# Patient Record
Sex: FEMALE | Race: BLACK OR AFRICAN AMERICAN | NOT HISPANIC OR LATINO | Employment: OTHER | URBAN - METROPOLITAN AREA
[De-identification: names, ages, dates, MRNs, and addresses within clinical notes are randomized per-mention and may not be internally consistent; named-entity substitution may affect disease eponyms.]

---

## 2021-11-10 ENCOUNTER — HOSPITAL ENCOUNTER (OUTPATIENT)
Facility: OTHER | Age: 86
Discharge: NURSING, REHAB OR LONG-TERM CARE | DRG: 522 | End: 2021-11-17
Attending: EMERGENCY MEDICINE | Admitting: INTERNAL MEDICINE
Payer: MEDICARE
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
## 2021-11-10 DIAGNOSIS — S72.002A CLOSED FRACTURE OF LEFT HIP, INITIAL ENCOUNTER: Primary | ICD-10-CM

## 2021-11-10 DIAGNOSIS — Z98.890 S/P LAPAROTOMY: ICD-10-CM

## 2021-11-10 LAB
ANION GAP SERPL CALC-SCNC: 14 MMOL/L (ref 8–16)
BASOPHILS # BLD AUTO: 0.02 K/UL (ref 0–0.2)
BASOPHILS NFR BLD: 0.1 % (ref 0–1.9)
BUN SERPL-MCNC: 20 MG/DL (ref 8–23)
CALCIUM SERPL-MCNC: 9.6 MG/DL (ref 8.7–10.5)
CHLORIDE SERPL-SCNC: 104 MMOL/L (ref 95–110)
CO2 SERPL-SCNC: 22 MMOL/L (ref 23–29)
CREAT SERPL-MCNC: 0.8 MG/DL (ref 0.5–1.4)
CTP QC/QA: YES
DIFFERENTIAL METHOD: ABNORMAL
EOSINOPHIL # BLD AUTO: 0 K/UL (ref 0–0.5)
EOSINOPHIL NFR BLD: 0.1 % (ref 0–8)
ERYTHROCYTE [DISTWIDTH] IN BLOOD BY AUTOMATED COUNT: 12.8 % (ref 11.5–14.5)
EST. GFR  (AFRICAN AMERICAN): >60 ML/MIN/1.73 M^2
EST. GFR  (NON AFRICAN AMERICAN): >60 ML/MIN/1.73 M^2
GLUCOSE SERPL-MCNC: 119 MG/DL (ref 70–110)
HCT VFR BLD AUTO: 32.7 % (ref 37–48.5)
HGB BLD-MCNC: 10.4 G/DL (ref 12–16)
IMM GRANULOCYTES # BLD AUTO: 0.1 K/UL (ref 0–0.04)
IMM GRANULOCYTES NFR BLD AUTO: 0.6 % (ref 0–0.5)
INR PPP: 1 (ref 0.8–1.2)
LYMPHOCYTES # BLD AUTO: 1.1 K/UL (ref 1–4.8)
LYMPHOCYTES NFR BLD: 6.3 % (ref 18–48)
MCH RBC QN AUTO: 29.6 PG (ref 27–31)
MCHC RBC AUTO-ENTMCNC: 31.8 G/DL (ref 32–36)
MCV RBC AUTO: 93 FL (ref 82–98)
MONOCYTES # BLD AUTO: 0.9 K/UL (ref 0.3–1)
MONOCYTES NFR BLD: 5.5 % (ref 4–15)
NEUTROPHILS # BLD AUTO: 14.7 K/UL (ref 1.8–7.7)
NEUTROPHILS NFR BLD: 87.4 % (ref 38–73)
NRBC BLD-RTO: 0 /100 WBC
PLATELET # BLD AUTO: 204 K/UL (ref 150–450)
PLATELET BLD QL SMEAR: ABNORMAL
PMV BLD AUTO: 12.4 FL (ref 9.2–12.9)
POTASSIUM SERPL-SCNC: 3.9 MMOL/L (ref 3.5–5.1)
PROTHROMBIN TIME: 11.4 SEC (ref 9–12.5)
RBC # BLD AUTO: 3.51 M/UL (ref 4–5.4)
SARS-COV-2 RDRP RESP QL NAA+PROBE: NEGATIVE
SODIUM SERPL-SCNC: 140 MMOL/L (ref 136–145)
WBC # BLD AUTO: 16.8 K/UL (ref 3.9–12.7)

## 2021-11-10 PROCEDURE — 93005 ELECTROCARDIOGRAM TRACING: CPT

## 2021-11-10 PROCEDURE — 63600175 PHARM REV CODE 636 W HCPCS: Performed by: EMERGENCY MEDICINE

## 2021-11-10 PROCEDURE — 94761 N-INVAS EAR/PLS OXIMETRY MLT: CPT

## 2021-11-10 PROCEDURE — G0378 HOSPITAL OBSERVATION PER HR: HCPCS

## 2021-11-10 PROCEDURE — U0002 COVID-19 LAB TEST NON-CDC: HCPCS | Performed by: EMERGENCY MEDICINE

## 2021-11-10 PROCEDURE — 80048 BASIC METABOLIC PNL TOTAL CA: CPT | Performed by: EMERGENCY MEDICINE

## 2021-11-10 PROCEDURE — 96374 THER/PROPH/DIAG INJ IV PUSH: CPT

## 2021-11-10 PROCEDURE — 93010 ELECTROCARDIOGRAM REPORT: CPT | Mod: ,,, | Performed by: INTERNAL MEDICINE

## 2021-11-10 PROCEDURE — 85610 PROTHROMBIN TIME: CPT | Performed by: EMERGENCY MEDICINE

## 2021-11-10 PROCEDURE — 99285 EMERGENCY DEPT VISIT HI MDM: CPT | Mod: 25

## 2021-11-10 PROCEDURE — 93010 EKG 12-LEAD: ICD-10-PCS | Mod: ,,, | Performed by: INTERNAL MEDICINE

## 2021-11-10 PROCEDURE — 85025 COMPLETE CBC W/AUTO DIFF WBC: CPT | Performed by: EMERGENCY MEDICINE

## 2021-11-10 RX ORDER — ACETAMINOPHEN 325 MG/1
650 TABLET ORAL EVERY 6 HOURS PRN
Status: DISCONTINUED | OUTPATIENT
Start: 2021-11-11 | End: 2021-11-18 | Stop reason: HOSPADM

## 2021-11-10 RX ORDER — FENTANYL CITRATE 50 UG/ML
50 INJECTION, SOLUTION INTRAMUSCULAR; INTRAVENOUS
Status: COMPLETED | OUTPATIENT
Start: 2021-11-10 | End: 2021-11-10

## 2021-11-10 RX ORDER — SODIUM CHLORIDE 0.9 % (FLUSH) 0.9 %
10 SYRINGE (ML) INJECTION
Status: DISCONTINUED | OUTPATIENT
Start: 2021-11-10 | End: 2021-11-18 | Stop reason: HOSPADM

## 2021-11-10 RX ORDER — MORPHINE SULFATE 4 MG/ML
4 INJECTION, SOLUTION INTRAMUSCULAR; INTRAVENOUS EVERY 4 HOURS PRN
Status: DISCONTINUED | OUTPATIENT
Start: 2021-11-11 | End: 2021-11-13

## 2021-11-10 RX ORDER — OXYCODONE AND ACETAMINOPHEN 5; 325 MG/1; MG/1
1 TABLET ORAL EVERY 4 HOURS PRN
Status: DISCONTINUED | OUTPATIENT
Start: 2021-11-11 | End: 2021-11-11 | Stop reason: SDUPTHER

## 2021-11-10 RX ADMIN — FENTANYL CITRATE 50 MCG: 50 INJECTION, SOLUTION INTRAMUSCULAR; INTRAVENOUS at 06:11

## 2021-11-10 RX ADMIN — MORPHINE SULFATE 4 MG: 4 INJECTION INTRAVENOUS at 11:11

## 2021-11-11 ENCOUNTER — ANESTHESIA (OUTPATIENT)
Dept: SURGERY | Facility: OTHER | Age: 86
DRG: 522 | End: 2021-11-11
Payer: MEDICARE

## 2021-11-11 ENCOUNTER — ANESTHESIA EVENT (OUTPATIENT)
Dept: SURGERY | Facility: OTHER | Age: 86
DRG: 522 | End: 2021-11-11
Payer: MEDICARE

## 2021-11-11 PROBLEM — S72.002A CLOSED LEFT HIP FRACTURE: Status: ACTIVE | Noted: 2021-11-11

## 2021-11-11 LAB
ESTIMATED AVG GLUCOSE: 108 MG/DL (ref 68–131)
HBA1C MFR BLD: 5.4 % (ref 4–5.6)
POCT GLUCOSE: 133 MG/DL (ref 70–110)
POCT GLUCOSE: 134 MG/DL (ref 70–110)
POCT GLUCOSE: 140 MG/DL (ref 70–110)

## 2021-11-11 PROCEDURE — 63600175 PHARM REV CODE 636 W HCPCS: Performed by: ORTHOPAEDIC SURGERY

## 2021-11-11 PROCEDURE — 88305 TISSUE EXAM BY PATHOLOGIST: ICD-10-PCS | Mod: 26,,, | Performed by: PATHOLOGY

## 2021-11-11 PROCEDURE — 36415 COLL VENOUS BLD VENIPUNCTURE: CPT | Performed by: PHYSICIAN ASSISTANT

## 2021-11-11 PROCEDURE — 25000003 PHARM REV CODE 250: Performed by: NURSE ANESTHETIST, CERTIFIED REGISTERED

## 2021-11-11 PROCEDURE — 99220 PR INITIAL OBSERVATION CARE,LEVL III: CPT | Mod: ,,, | Performed by: PHYSICIAN ASSISTANT

## 2021-11-11 PROCEDURE — 63600175 PHARM REV CODE 636 W HCPCS: Performed by: ANESTHESIOLOGY

## 2021-11-11 PROCEDURE — 64450 NJX AA&/STRD OTHER PN/BRANCH: CPT | Performed by: ANESTHESIOLOGY

## 2021-11-11 PROCEDURE — G0378 HOSPITAL OBSERVATION PER HR: HCPCS

## 2021-11-11 PROCEDURE — 88311 DECALCIFY TISSUE: CPT | Mod: 26,,, | Performed by: PATHOLOGY

## 2021-11-11 PROCEDURE — 27201423 OPTIME MED/SURG SUP & DEVICES STERILE SUPPLY: Performed by: ORTHOPAEDIC SURGERY

## 2021-11-11 PROCEDURE — C1776 JOINT DEVICE (IMPLANTABLE): HCPCS | Performed by: ORTHOPAEDIC SURGERY

## 2021-11-11 PROCEDURE — 88304 TISSUE EXAM BY PATHOLOGIST: CPT | Performed by: PATHOLOGY

## 2021-11-11 PROCEDURE — 94761 N-INVAS EAR/PLS OXIMETRY MLT: CPT

## 2021-11-11 PROCEDURE — 36000711: Performed by: ORTHOPAEDIC SURGERY

## 2021-11-11 PROCEDURE — 63600175 PHARM REV CODE 636 W HCPCS: Mod: JG | Performed by: NURSE ANESTHETIST, CERTIFIED REGISTERED

## 2021-11-11 PROCEDURE — 96366 THER/PROPH/DIAG IV INF ADDON: CPT | Mod: 59

## 2021-11-11 PROCEDURE — 11000001 HC ACUTE MED/SURG PRIVATE ROOM

## 2021-11-11 PROCEDURE — 37000009 HC ANESTHESIA EA ADD 15 MINS: Performed by: ORTHOPAEDIC SURGERY

## 2021-11-11 PROCEDURE — 25000003 PHARM REV CODE 250: Performed by: PHYSICIAN ASSISTANT

## 2021-11-11 PROCEDURE — 71000039 HC RECOVERY, EACH ADD'L HOUR: Performed by: ORTHOPAEDIC SURGERY

## 2021-11-11 PROCEDURE — 96376 TX/PRO/DX INJ SAME DRUG ADON: CPT | Mod: 59

## 2021-11-11 PROCEDURE — 63600175 PHARM REV CODE 636 W HCPCS: Performed by: PHYSICIAN ASSISTANT

## 2021-11-11 PROCEDURE — 25000003 PHARM REV CODE 250: Performed by: ORTHOPAEDIC SURGERY

## 2021-11-11 PROCEDURE — 88305 TISSUE EXAM BY PATHOLOGIST: CPT | Mod: 26,,, | Performed by: PATHOLOGY

## 2021-11-11 PROCEDURE — C1713 ANCHOR/SCREW BN/BN,TIS/BN: HCPCS | Performed by: ORTHOPAEDIC SURGERY

## 2021-11-11 PROCEDURE — 88311 PR  DECALCIFY TISSUE: ICD-10-PCS | Mod: 26,,, | Performed by: PATHOLOGY

## 2021-11-11 PROCEDURE — 25000003 PHARM REV CODE 250: Performed by: ANESTHESIOLOGY

## 2021-11-11 PROCEDURE — 71000033 HC RECOVERY, INTIAL HOUR: Performed by: ORTHOPAEDIC SURGERY

## 2021-11-11 PROCEDURE — 83036 HEMOGLOBIN GLYCOSYLATED A1C: CPT | Performed by: PHYSICIAN ASSISTANT

## 2021-11-11 PROCEDURE — 96365 THER/PROPH/DIAG IV INF INIT: CPT | Mod: 59

## 2021-11-11 PROCEDURE — 36000710: Performed by: ORTHOPAEDIC SURGERY

## 2021-11-11 PROCEDURE — 99220 PR INITIAL OBSERVATION CARE,LEVL III: ICD-10-PCS | Mod: ,,, | Performed by: PHYSICIAN ASSISTANT

## 2021-11-11 PROCEDURE — 63600175 PHARM REV CODE 636 W HCPCS

## 2021-11-11 PROCEDURE — 37000008 HC ANESTHESIA 1ST 15 MINUTES: Performed by: ORTHOPAEDIC SURGERY

## 2021-11-11 PROCEDURE — P9045 ALBUMIN (HUMAN), 5%, 250 ML: HCPCS | Mod: JG | Performed by: NURSE ANESTHETIST, CERTIFIED REGISTERED

## 2021-11-11 DEVICE — IMPLANTABLE DEVICE: Type: IMPLANTABLE DEVICE | Site: HIP | Status: FUNCTIONAL

## 2021-11-11 DEVICE — IMPLANTABLE DEVICE
Type: IMPLANTABLE DEVICE | Site: HIP | Status: FUNCTIONAL
Brand: BMP CABLE SYSTEM

## 2021-11-11 DEVICE — CEMENT BONE STD: Type: IMPLANTABLE DEVICE | Site: HIP | Status: FUNCTIONAL

## 2021-11-11 RX ORDER — LIDOCAINE 50 MG/G
1 PATCH TOPICAL
Status: DISCONTINUED | OUTPATIENT
Start: 2021-11-11 | End: 2021-11-18 | Stop reason: HOSPADM

## 2021-11-11 RX ORDER — GLUCAGON 1 MG
1 KIT INJECTION
Status: DISCONTINUED | OUTPATIENT
Start: 2021-11-11 | End: 2021-11-18 | Stop reason: HOSPADM

## 2021-11-11 RX ORDER — HYDROCODONE BITARTRATE AND ACETAMINOPHEN 10; 325 MG/1; MG/1
1 TABLET ORAL EVERY 4 HOURS PRN
Status: DISCONTINUED | OUTPATIENT
Start: 2021-11-11 | End: 2021-11-14

## 2021-11-11 RX ORDER — TALC
9 POWDER (GRAM) TOPICAL NIGHTLY PRN
Status: DISCONTINUED | OUTPATIENT
Start: 2021-11-11 | End: 2021-11-18 | Stop reason: HOSPADM

## 2021-11-11 RX ORDER — EPHEDRINE SULFATE 50 MG/ML
INJECTION, SOLUTION INTRAVENOUS
Status: DISCONTINUED | OUTPATIENT
Start: 2021-11-11 | End: 2021-11-11

## 2021-11-11 RX ORDER — METOCLOPRAMIDE HYDROCHLORIDE 5 MG/ML
5 INJECTION INTRAMUSCULAR; INTRAVENOUS EVERY 6 HOURS PRN
Status: DISCONTINUED | OUTPATIENT
Start: 2021-11-11 | End: 2021-11-14

## 2021-11-11 RX ORDER — HYDROMORPHONE HYDROCHLORIDE 2 MG/ML
0.4 INJECTION, SOLUTION INTRAMUSCULAR; INTRAVENOUS; SUBCUTANEOUS EVERY 5 MIN PRN
Status: DISCONTINUED | OUTPATIENT
Start: 2021-11-11 | End: 2021-11-14

## 2021-11-11 RX ORDER — ROPIVACAINE HYDROCHLORIDE 5 MG/ML
INJECTION, SOLUTION EPIDURAL; INFILTRATION; PERINEURAL
Status: DISCONTINUED | OUTPATIENT
Start: 2021-11-11 | End: 2021-11-11 | Stop reason: HOSPADM

## 2021-11-11 RX ORDER — ROPIVACAINE HYDROCHLORIDE 5 MG/ML
INJECTION, SOLUTION EPIDURAL; INFILTRATION; PERINEURAL
Status: COMPLETED | OUTPATIENT
Start: 2021-11-11 | End: 2021-11-11

## 2021-11-11 RX ORDER — MEPERIDINE HYDROCHLORIDE 25 MG/ML
12.5 INJECTION INTRAMUSCULAR; INTRAVENOUS; SUBCUTANEOUS ONCE AS NEEDED
Status: ACTIVE | OUTPATIENT
Start: 2021-11-11 | End: 2021-11-12

## 2021-11-11 RX ORDER — MIDAZOLAM HYDROCHLORIDE 1 MG/ML
2 INJECTION INTRAMUSCULAR; INTRAVENOUS
Status: CANCELLED | OUTPATIENT
Start: 2021-11-11 | End: 2021-11-11

## 2021-11-11 RX ORDER — ONDANSETRON 8 MG/1
8 TABLET, ORALLY DISINTEGRATING ORAL EVERY 8 HOURS PRN
Status: DISCONTINUED | OUTPATIENT
Start: 2021-11-11 | End: 2021-11-14

## 2021-11-11 RX ORDER — INSULIN ASPART 100 [IU]/ML
0-5 INJECTION, SOLUTION INTRAVENOUS; SUBCUTANEOUS EVERY 6 HOURS PRN
Status: DISCONTINUED | OUTPATIENT
Start: 2021-11-11 | End: 2021-11-18 | Stop reason: HOSPADM

## 2021-11-11 RX ORDER — PROPOFOL 10 MG/ML
VIAL (ML) INTRAVENOUS
Status: DISCONTINUED | OUTPATIENT
Start: 2021-11-11 | End: 2021-11-11

## 2021-11-11 RX ORDER — FENTANYL CITRATE 50 UG/ML
INJECTION, SOLUTION INTRAMUSCULAR; INTRAVENOUS
Status: DISCONTINUED | OUTPATIENT
Start: 2021-11-11 | End: 2021-11-11

## 2021-11-11 RX ORDER — PROCHLORPERAZINE EDISYLATE 5 MG/ML
5 INJECTION INTRAMUSCULAR; INTRAVENOUS EVERY 30 MIN PRN
Status: DISCONTINUED | OUTPATIENT
Start: 2021-11-11 | End: 2021-11-14

## 2021-11-11 RX ORDER — ALBUMIN HUMAN 50 G/1000ML
SOLUTION INTRAVENOUS CONTINUOUS PRN
Status: DISCONTINUED | OUTPATIENT
Start: 2021-11-11 | End: 2021-11-11

## 2021-11-11 RX ORDER — PHENYLEPHRINE HYDROCHLORIDE 10 MG/ML
INJECTION INTRAVENOUS
Status: DISCONTINUED | OUTPATIENT
Start: 2021-11-11 | End: 2021-11-11

## 2021-11-11 RX ORDER — OXYCODONE HYDROCHLORIDE 5 MG/1
5 TABLET ORAL
Status: DISCONTINUED | OUTPATIENT
Start: 2021-11-11 | End: 2021-11-14

## 2021-11-11 RX ORDER — CEFAZOLIN SODIUM 1 G/3ML
INJECTION, POWDER, FOR SOLUTION INTRAMUSCULAR; INTRAVENOUS
Status: DISCONTINUED | OUTPATIENT
Start: 2021-11-11 | End: 2021-11-11

## 2021-11-11 RX ORDER — FENTANYL CITRATE 50 UG/ML
100 INJECTION, SOLUTION INTRAMUSCULAR; INTRAVENOUS EVERY 5 MIN PRN
Status: CANCELLED | OUTPATIENT
Start: 2021-11-11

## 2021-11-11 RX ORDER — SODIUM CHLORIDE 0.9 % (FLUSH) 0.9 %
3 SYRINGE (ML) INJECTION
Status: DISCONTINUED | OUTPATIENT
Start: 2021-11-11 | End: 2021-11-18 | Stop reason: HOSPADM

## 2021-11-11 RX ORDER — DEXTROSE MONOHYDRATE AND SODIUM CHLORIDE 5; .9 G/100ML; G/100ML
INJECTION, SOLUTION INTRAVENOUS CONTINUOUS
Status: DISCONTINUED | OUTPATIENT
Start: 2021-11-11 | End: 2021-11-12

## 2021-11-11 RX ORDER — LIDOCAINE HCL/PF 100 MG/5ML
SYRINGE (ML) INTRAVENOUS
Status: DISCONTINUED | OUTPATIENT
Start: 2021-11-11 | End: 2021-11-11

## 2021-11-11 RX ORDER — CEFAZOLIN SODIUM 2 G/50ML
2 SOLUTION INTRAVENOUS
Status: COMPLETED | OUTPATIENT
Start: 2021-11-11 | End: 2021-11-12

## 2021-11-11 RX ORDER — TRANEXAMIC ACID 100 MG/ML
INJECTION, SOLUTION INTRAVENOUS
Status: DISCONTINUED | OUTPATIENT
Start: 2021-11-11 | End: 2021-11-11 | Stop reason: HOSPADM

## 2021-11-11 RX ORDER — ONDANSETRON 2 MG/ML
INJECTION INTRAMUSCULAR; INTRAVENOUS
Status: DISCONTINUED | OUTPATIENT
Start: 2021-11-11 | End: 2021-11-11

## 2021-11-11 RX ORDER — PROPOFOL 10 MG/ML
VIAL (ML) INTRAVENOUS CONTINUOUS PRN
Status: DISCONTINUED | OUTPATIENT
Start: 2021-11-11 | End: 2021-11-11

## 2021-11-11 RX ORDER — POLYETHYLENE GLYCOL 3350 17 G/17G
17 POWDER, FOR SOLUTION ORAL DAILY
Status: DISCONTINUED | OUTPATIENT
Start: 2021-11-12 | End: 2021-11-18 | Stop reason: HOSPADM

## 2021-11-11 RX ADMIN — ONDANSETRON HYDROCHLORIDE 4 MG: 2 INJECTION INTRAMUSCULAR; INTRAVENOUS at 11:11

## 2021-11-11 RX ADMIN — LIDOCAINE HYDROCHLORIDE 50 MG: 20 INJECTION, SOLUTION INTRAVENOUS at 11:11

## 2021-11-11 RX ADMIN — PHENYLEPHRINE HYDROCHLORIDE 200 MCG: 10 INJECTION INTRAVENOUS at 12:11

## 2021-11-11 RX ADMIN — CEFAZOLIN SODIUM 2 G: 2 SOLUTION INTRAVENOUS at 08:11

## 2021-11-11 RX ADMIN — PHENYLEPHRINE HYDROCHLORIDE 100 MCG: 10 INJECTION INTRAVENOUS at 11:11

## 2021-11-11 RX ADMIN — FENTANYL CITRATE 25 MCG: 50 INJECTION, SOLUTION INTRAMUSCULAR; INTRAVENOUS at 11:11

## 2021-11-11 RX ADMIN — LIDOCAINE 5% 1 PATCH: 700 PATCH TOPICAL at 05:11

## 2021-11-11 RX ADMIN — PHENYLEPHRINE HYDROCHLORIDE 200 MCG: 10 INJECTION INTRAVENOUS at 11:11

## 2021-11-11 RX ADMIN — CEFAZOLIN 2 G: 330 INJECTION, POWDER, FOR SOLUTION INTRAMUSCULAR; INTRAVENOUS at 11:11

## 2021-11-11 RX ADMIN — ROPIVACAINE HYDROCHLORIDE 15 ML: 5 INJECTION, SOLUTION EPIDURAL; INFILTRATION; PERINEURAL at 11:11

## 2021-11-11 RX ADMIN — FENTANYL CITRATE 50 MCG: 50 INJECTION, SOLUTION INTRAMUSCULAR; INTRAVENOUS at 11:11

## 2021-11-11 RX ADMIN — PROPOFOL 35 MCG/KG/MIN: 10 INJECTION, EMULSION INTRAVENOUS at 11:11

## 2021-11-11 RX ADMIN — PROPOFOL 20 MG: 10 INJECTION, EMULSION INTRAVENOUS at 11:11

## 2021-11-11 RX ADMIN — ROPIVACAINE HYDROCHLORIDE 3 ML: 5 INJECTION, SOLUTION EPIDURAL; INFILTRATION; PERINEURAL at 11:11

## 2021-11-11 RX ADMIN — EPHEDRINE SULFATE 10 MG: 50 INJECTION INTRAVENOUS at 12:11

## 2021-11-11 RX ADMIN — ALBUMIN (HUMAN): 12.5 SOLUTION INTRAVENOUS at 11:11

## 2021-11-11 RX ADMIN — MORPHINE SULFATE 4 MG: 4 INJECTION INTRAVENOUS at 09:11

## 2021-11-11 RX ADMIN — DEXTROSE AND SODIUM CHLORIDE: 5; .9 INJECTION, SOLUTION INTRAVENOUS at 06:11

## 2021-11-11 RX ADMIN — PHENYLEPHRINE HYDROCHLORIDE 100 MCG: 10 INJECTION INTRAVENOUS at 12:11

## 2021-11-11 RX ADMIN — OXYCODONE 5 MG: 5 TABLET ORAL at 08:11

## 2021-11-11 NOTE — SUBJECTIVE & OBJECTIVE
Past Medical History:   Diagnosis Date    DM type 2, controlled, with complication 5/4/2016    Essential hypertension 5/4/2016    GERD (gastroesophageal reflux disease)        Past Surgical History:   Procedure Laterality Date    CHOLECYSTECTOMY      HERNIA REPAIR  04/14/2016    exploratory laparotomy for SBO       Review of patient's allergies indicates:  No Known Allergies    No current facility-administered medications on file prior to encounter.     Current Outpatient Medications on File Prior to Encounter   Medication Sig    diclofenac sodium 1 % Gel Apply topically 2 (two) times daily as needed. (for pain). Apply to both knees and big toes    dorzolamide (TRUSOPT) 2 % ophthalmic solution Place 1 drop into the right eye every morning.    ferrous sulfate 325 (65 FE) MG EC tablet Take 1 tablet (325 mg total) by mouth once daily.    latanoprost 0.005 % ophthalmic solution Place 1 drop into the right eye every evening.    multivitamin (THERAGRAN) tablet Take 1 tablet by mouth once daily.    senna-docusate 8.6-50 mg (PERICOLACE) 8.6-50 mg per tablet Take 1 tablet by mouth 2 (two) times daily.    simvastatin (ZOCOR) 10 MG tablet Take 10 mg by mouth every evening.    amlodipine (NORVASC) 5 MG tablet Take 1 tablet (5 mg total) by mouth once daily.    aspirin (ECOTRIN) 81 MG EC tablet Take 81 mg by mouth once daily.    hydrochlorothiazide (HYDRODIURIL) 25 MG tablet Take 1 tablet (25 mg total) by mouth once daily. HOLD UNTIL TOLD TO RESUME BY PHYSICIAN    hydrocodone-acetaminophen 7.5-325mg (NORCO) 7.5-325 mg per tablet Take 1 tablet by mouth every 6 (six) hours as needed for Pain.    polyethylene glycol (GLYCOLAX) 17 gram PwPk Take 17 g by mouth every other day.    timolol maleate 0.5% (TIMOPTIC) 0.5 % Drop Place 1 drop into the right eye once daily.     Family History    None       Tobacco Use    Smoking status: Never Smoker    Smokeless tobacco: Not on file   Substance and Sexual Activity     Alcohol use: No    Drug use: No    Sexual activity: Not on file     Review of Systems   Constitutional: Negative for activity change, appetite change, chills and fever.   Respiratory: Negative for cough, shortness of breath and wheezing.    Cardiovascular: Negative for chest pain, palpitations and leg swelling.   Gastrointestinal: Negative for abdominal pain, diarrhea, nausea and vomiting.   Genitourinary: Negative for dysuria, flank pain, frequency, hematuria and urgency.   Musculoskeletal: Positive for arthralgias and gait problem. Negative for back pain, joint swelling and neck pain.   Skin: Negative for color change, pallor, rash and wound.   Neurological: Negative for dizziness, syncope, weakness, light-headedness and numbness.   Psychiatric/Behavioral: Negative for agitation and confusion.     Objective:     Vital Signs (Most Recent):  Temp: 98.2 °F (36.8 °C) (11/11/21 0423)  Pulse: 109 (11/11/21 0423)  Resp: 18 (11/11/21 0423)  BP: 133/84 (11/11/21 0423)  SpO2: 95 % (11/11/21 0423) Vital Signs (24h Range):  Temp:  [98 °F (36.7 °C)-98.2 °F (36.8 °C)] 98.2 °F (36.8 °C)  Pulse:  [] 109  Resp:  [16-23] 18  SpO2:  [93 %-99 %] 95 %  BP: (132-152)/(81-87) 133/84     Weight: 72.1 kg (159 lb)  Body mass index is 24.9 kg/m².    Physical Exam  Vitals and nursing note reviewed.   Constitutional:       General: She is not in acute distress.     Appearance: Normal appearance. She is well-developed, normal weight and well-nourished. She is not ill-appearing or diaphoretic.   HENT:      Head: Normocephalic and atraumatic.   Eyes:      General: No scleral icterus.     Extraocular Movements: EOM normal.      Conjunctiva/sclera: Conjunctivae normal.      Pupils: Pupils are equal, round, and reactive to light.   Neck:      Trachea: No tracheal deviation.   Cardiovascular:      Rate and Rhythm: Normal rate and regular rhythm.      Pulses: Intact distal pulses.      Heart sounds: Normal heart sounds. No murmur heard.  No  gallop.    Pulmonary:      Effort: Pulmonary effort is normal. No respiratory distress.      Breath sounds: Normal breath sounds. No stridor. No wheezing or rales.   Abdominal:      General: Bowel sounds are normal. There is no distension.      Palpations: Abdomen is soft. There is no mass.      Tenderness: There is no abdominal tenderness. There is no guarding.   Musculoskeletal:         General: Tenderness (  ), deformity (LLE shortened ) and signs of injury present. No swelling. Normal range of motion.      Cervical back: Normal range of motion and neck supple.      Left lower leg: No edema.   Skin:     General: Skin is warm and dry.      Coloration: Skin is not pale.      Findings: No erythema or rash.   Neurological:      General: No focal deficit present.      Mental Status: She is alert and oriented to person, place, and time. Mental status is at baseline.      Cranial Nerves: No cranial nerve deficit.      Motor: No abnormal muscle tone.   Psychiatric:         Mood and Affect: Mood and affect and mood normal.         Behavior: Behavior normal.         Thought Content: Thought content normal.         Judgment: Judgment normal.           CRANIAL NERVES     CN III, IV, VI   Pupils are equal, round, and reactive to light.  Extraocular motions are normal.        Significant Labs:   All pertinent labs within the past 24 hours have been reviewed.  BMP:   Recent Labs   Lab 11/10/21  1827   *      K 3.9      CO2 22*   BUN 20   CREATININE 0.8   CALCIUM 9.6     CBC:   Recent Labs   Lab 11/10/21  1827   WBC 16.80*   HGB 10.4*   HCT 32.7*        CMP:   Recent Labs   Lab 11/10/21  1827      K 3.9      CO2 22*   *   BUN 20   CREATININE 0.8   CALCIUM 9.6   ANIONGAP 14   EGFRNONAA >60     Urine Culture: No results for input(s): LABURIN in the last 48 hours.  Urine Studies: No results for input(s): COLORU, APPEARANCEUA, PHUR, SPECGRAV, PROTEINUA, GLUCUA, KETONESU, BILIRUBINUA,  OCCULTUA, NITRITE, UROBILINOGEN, LEUKOCYTESUR, RBCUA, WBCUA, BACTERIA, SQUAMEPITHEL, HYALINECASTS in the last 48 hours.    Invalid input(s): WRIGHTSUR    Significant Imaging: I have reviewed all pertinent imaging results/findings within the past 24 hours.   Imaging Results          X-Ray Hip 2 or 3 views Left (with Pelvis when performed) (Final result)  Result time 11/10/21 18:30:25    Final result by Margarita Marx MD (11/10/21 18:30:25)                 Impression:      Left femoral neck fracture.      Electronically signed by: Margarita Marx  Date:    11/10/2021  Time:    18:30             Narrative:    EXAMINATION:  XR HIP WITH PELVIS, 2 OR THREE VIEWS LEFT    CLINICAL HISTORY:  Unspecified fall, initial encounter    TECHNIQUE:  AP pelvis and two views of the left hip.    COMPARISON:  None.    FINDINGS:  Examination is limited by body habitus.  Two views of the left hip demonstrate an acute fracture of the left femoral neck with associated varus deformity.  Mild degenerative changes are seen at both hip joints.  Bones are diffusely osteopenic.  There are advanced vascular calcifications.

## 2021-11-11 NOTE — ASSESSMENT & PLAN NOTE
- last A1C:   Lab Results   Component Value Date    HGBA1C 5.8 04/20/2016   - patient no longer takes diabetes meds    - Diabetic diet   - SSI with accuchecks AC/HS until NPO and then [id number]

## 2021-11-11 NOTE — OP NOTE
[facility]  Surgery Department  Operative Note    SUMMARY     Date of Procedure: 11/11/2021     Procedure: Procedure(s) (LRB):  HEMIARTHROPLASTY, HIP (Left)     Surgeon(s) and Role:     * Jose Quintana MD - Primary    Assisting Surgeon: None    Pre-Operative Diagnosis: Pain with hip hemiarthroplasty [T84.84XA, Z96.649]    Post-Operative Diagnosis: Post-Op Diagnosis Codes:     * Pain with hip hemiarthroplasty [T84.84XA, Z96.649]    Anesthesia: General    Operative Findings (including complications, if any):  Displaced left femoral neck fracture    Description of Technical Procedures:  89-year-old who lives with the .  Fell at home sustaining displaced left femoral neck fracture.  Poor bone quality options discussed cemented endoprosthesis elected significant risk discussed    Patient brought the operating room underwent spinal anesthesia without difficulty she is placed supine position with a bump beneath the sacrum and the left hip was prepped in usual fashion.  Should be noted that her left knee was significant valgus in very stiff.  A posterior curved lateral incision was made over the greater trochanter.  Sharp dissection made down ITB band I team band was split the gluteus sushant fascia was split and Briggs approach was performed by split the 1/3 gluteus medius and vastus lateralis off the femur.  The hip was dislocated through the neck fracture.  Standard neck cut was performed to clean it up.  Careful placed in the stab retractors the head was removed.  Sizing was a 46. Attention turned to the femur with a cookie cutter straight reaming and then broaching up to an 8 which showed excellent fixation no subsidence.  While putting the neck angle on an reducing she sustained a fracture of the proximal femur just below the lesser trochanter.  This was recognized and secured with a cable.  Reduced with a cable and the 8 in place.  With the short 8 showed very stiff because of the  stiffness in the knee.  But excellent stability and leg lengths equal which was tough to tell because of the deformity in the knee.  A 7 stem was then cemented using good cement technique.  Excess cement was removed.  Final reduction with a -646 again showed good stability leg lengths normal considering the knee deformity.  Ortho cord was using the abductor musculature at the trochanter.  1. Vicryl using the rest of the abductor musculature and vastus lateralis number Vicryl in ITB band number Vicryl in the fatty layer to 0 Vicryl subcutaneously staples on the skin tranexamic acid and ropivacaine were instilled the soft tissues she is placed in bulky sterile dressing abductor pillow brought to come sterile fashion leg lengths essentially equal    This performed with a poor recognition system      Significant Surgical Tasks Conducted by the Assistant(s), if Applicable:  Assistant amberly Jorgensen    Estimated Blood Loss (EBL): 200 mL           Implants:   Implant Name Type Inv. Item Serial No.  Lot No. LRB No. Used Action   CEMENT BONE STD - [id number]  CEMENT BONE STD  [employer] [id number] Left 1 Implanted   CEMENT BONE STD - [id number]  CEMENT BONE STD  [employer] [id number] Left 1 Implanted   SET BONE CABLE SLEEVE [id number] - [id number]  SET BONE CABLE SLEEVE [id number]  [employer] [id number] Left 1 Implanted   FEMORAL STEM ELBERT ECHO FX7MM CC - [id number]  FEMORAL STEM ELBERT ECHO FX7MM CC  [employer] [id number] Left 1 Implanted   FEMORAL CENTRALIZER STEM 9MM - [id number]  FEMORAL CENTRALIZER STEM 9MM  [employer] [id number] Left 1 Implanted   FEMORAL INSERT TAPER TYPE 1 -6 - [id number]  FEMORAL INSERT TAPER TYPE 1 -6  [employer] [id number] Left 1 Implanted   FEMORAL HEAD ENDO II 46 MM - [id number]  FEMORAL HEAD ENDO II 46 MM  [employer] [id number] Left 1 Implanted       Specimens:   Specimen (24h ago, onward)             Start     Ordered    11/11/21 1202  Specimen to Pathology, Surgery Orthopedics  Once        Comments:  Pre-op Diagnosis: Pain with hip hemiarthroplasty [T84.84XA, Z96.649]    Procedure(s):  HEMIARTHROPLASTY, HIP     Number of specimens: 1    Name of specimens:   1. Left Femoral Head   References:&nbsp;&nbsp;&nbsp;&nbsp;Click here for ordering Quick Tip   Question Answer Comment   Procedure Type: Orthopedics    Release to patient Immediate        11/11/21 1202                        Condition: Good    Disposition: PACU - hemodynamically stable.    Attestation: I was present and scrubbed for the entire procedure.

## 2021-11-11 NOTE — ASSESSMENT & PLAN NOTE
- Ms. Marylu Fenton presents after 2 witnessed falls at home with left femoral neck fracture  - NPO for OR today  - pain management  - ortho recs

## 2021-11-11 NOTE — SUBJECTIVE & OBJECTIVE
Interval History:  Patient in bed resting complains of pain waiting OR today    Review of Systems   Constitutional: Negative for activity change, appetite change, chills and fever.   Respiratory: Negative for cough, shortness of breath and wheezing.    Cardiovascular: Negative for chest pain, palpitations and leg swelling.   Gastrointestinal: Negative for abdominal pain, diarrhea, nausea and vomiting.   Genitourinary: Negative for dysuria, flank pain, frequency, hematuria and urgency.   Musculoskeletal: Positive for arthralgias and gait problem. Negative for back pain, joint swelling and neck pain.   Skin: Negative for color change, pallor, rash and wound.   Neurological: Negative for dizziness, syncope, weakness, light-headedness and numbness.   Psychiatric/Behavioral: Negative for agitation and confusion.     Objective:     Vital Signs (Most Recent):  Temp: 98.7 °F (37.1 °C) (11/11/21 0805)  Pulse: 99 (11/11/21 0805)  Resp: 18 (11/11/21 0926)  BP: 133/79 (11/11/21 0805)  SpO2: 95 % (11/11/21 0805) Vital Signs (24h Range):  Temp:  [98 °F (36.7 °C)-98.7 °F (37.1 °C)] 98.7 °F (37.1 °C)  Pulse:  [] 99  Resp:  [16-23] 18  SpO2:  [93 %-99 %] 95 %  BP: (132-152)/(79-87) 133/79     Weight: 72.1 kg (159 lb)  Body mass index is 24.9 kg/m².    Intake/Output Summary (Last 24 hours) at 11/11/2021 1250  Last data filed at 11/11/2021 1239  Gross per 24 hour   Intake 500 ml   Output 1300 ml   Net -800 ml      Physical Exam  Vitals and nursing note reviewed.   Constitutional:       General: She is not in acute distress.     Appearance: Normal appearance. She is well-developed. She is not ill-appearing or diaphoretic.   HENT:      Head: Normocephalic and atraumatic.   Eyes:      General: No scleral icterus.     Conjunctiva/sclera: Conjunctivae normal.   Neck:      Trachea: No tracheal deviation.   Cardiovascular:      Rate and Rhythm: Normal rate and regular rhythm.      Heart sounds: Normal heart sounds.   Pulmonary:       Effort: Pulmonary effort is normal. No respiratory distress.      Breath sounds: Normal breath sounds. No wheezing.   Abdominal:      General: Bowel sounds are normal. There is no distension.      Palpations: Abdomen is soft.      Tenderness: There is no abdominal tenderness.   Musculoskeletal:         General: Tenderness, deformity (LLE shortened ) and signs of injury present. No swelling. Normal range of motion.      Cervical back: Normal range of motion and neck supple.      Left lower leg: No edema.   Skin:     General: Skin is warm and dry.   Neurological:      Mental Status: She is alert and oriented to person, place, and time.   Psychiatric:         Mood and Affect: Mood normal.         Significant Labs:   All pertinent labs within the past 24 hours have been reviewed.  CBC:   Recent Labs   Lab 11/10/21  1827   WBC 16.80*   HGB 10.4*   HCT 32.7*        CMP:   Recent Labs   Lab 11/10/21  1827      K 3.9      CO2 22*   *   BUN 20   CREATININE 0.8   CALCIUM 9.6   ANIONGAP 14   EGFRNONAA >60       Significant Imaging: I have reviewed all pertinent imaging results/findings within the past 24 hours.   Imaging Results          X-Ray Hip 2 or 3 views Left (with Pelvis when performed) (Final result)  Result time 11/10/21 18:30:25    Final result by Margarita Marx MD (11/10/21 18:30:25)                 Impression:      Left femoral neck fracture.      Electronically signed by: Margarita Marx  Date:    11/10/2021  Time:    18:30             Narrative:    EXAMINATION:  XR HIP WITH PELVIS, 2 OR THREE VIEWS LEFT    CLINICAL HISTORY:  Unspecified fall, initial encounter    TECHNIQUE:  AP pelvis and two views of the left hip.    COMPARISON:  None.    FINDINGS:  Examination is limited by body habitus.  Two views of the left hip demonstrate an acute fracture of the left femoral neck with associated varus deformity.  Mild degenerative changes are seen at both hip joints.  Bones are diffusely  osteopenic.  There are advanced vascular calcifications.

## 2021-11-11 NOTE — OR NURSING
VSS on 2L NC. No pain present per pt. Dressing and PIV C/D/I. Hale intact and draining. Meets PACU discharge criteria. Ready for transfer to [facility]. Report given to Deidra GRIFFITHS RN. Daughter updated on pt transfer.

## 2021-11-11 NOTE — PLAN OF CARE
This CM Clinic Team attempted to assess patient for discharge needs; OOR at the time of visit  CM to continue to follow for discharge planning

## 2021-11-11 NOTE — ED PROVIDER NOTES
Encounter Date: 11/10/2021    SCRIBE #1 NOTE: I, Lindsey Hawkins, am scribing for, and in the presence of, Hamilton Aguillon MD.       History     Chief Complaint   Patient presents with    Fall     + L hip shortening and rotation per EMS. 12 lead unremarkable. Denies syncopal episode     Time seen by provider: 6:00 PM    This is a 89 y.o. female with history of GERD, DM type 2, and essential HTN who presents via EMS s/p two falls. Patient is accompanied by her  and her granddaughter. Patient's granddaughter reports patient was using her walker to go to the bathroom when she slipped and fell. Patient's  was unable to lift the patient and called the fire department who came and lifted the patient into her wheelchair. A few moments later the patient was trying to get out of her wheelchair to use the bathroom and she slipped out and fell onto the floor, which prompted her  to activate EMS. Per EMS, they noticed left hip shortening and rotation. Patient reports right-sided neck pain and left hip pain since her falls. Patient denies hitting her head or LOC for either fall. She denies feeling light-headed prior to either fall. She notes last time she drank or ate was when she had breakfast. She denies chest pain. This is the extent of the patient's complaints at this time.     The history is provided by the patient.     Review of patient's allergies indicates:  No Known Allergies  Past Medical History:   Diagnosis Date    DM type 2, controlled, with complication 5/4/2016    Essential hypertension 5/4/2016    GERD (gastroesophageal reflux disease)      Past Surgical History:   Procedure Laterality Date    CHOLECYSTECTOMY      HERNIA REPAIR  04/14/2016    exploratory laparotomy for SBO     No family history on file.  Social History     Tobacco Use    Smoking status: Never Smoker   Substance Use Topics    Alcohol use: No    Drug use: No     Review of Systems   Constitutional: Negative for chills and  fever.   HENT: Negative for rhinorrhea, sore throat and trouble swallowing.    Respiratory: Negative for chest tightness, shortness of breath and wheezing.    Cardiovascular: Negative for chest pain, palpitations and leg swelling.   Gastrointestinal: Negative for abdominal pain, diarrhea, nausea and vomiting.   Genitourinary: Negative for dysuria and vaginal bleeding.   Musculoskeletal: Positive for neck pain (right-sided).        Notes left hip pain.    Neurological: Negative for syncope, speech difficulty and light-headedness.        Denies head trauma.    All other systems reviewed and are negative.      Physical Exam     Initial Vitals [11/10/21 1748]   BP Pulse Resp Temp SpO2   (!) 149/84 95 16 98 °F (36.7 °C) 99 %      MAP       --         Physical Exam    Nursing note and vitals reviewed.  Constitutional: She appears well-developed and well-nourished. She is not diaphoretic. No distress.   HENT:   Head: Normocephalic and atraumatic.   Nose: Nose normal.   Mouth/Throat: Oropharynx is clear and moist.   Eyes: Conjunctivae and EOM are normal. Pupils are equal, round, and reactive to light.   Neck: Neck supple. No JVD present.   Normal range of motion.  Cardiovascular: Normal rate, regular rhythm, normal heart sounds and intact distal pulses.   Pulmonary/Chest: Breath sounds normal. No respiratory distress. She has no wheezes. She has no rhonchi. She has no rales. She exhibits no tenderness.   Abdominal: Abdomen is soft. Bowel sounds are normal. She exhibits no distension. There is no abdominal tenderness. There is no rebound and no guarding.   Musculoskeletal:      Cervical back: Normal range of motion and neck supple.      Comments: Left lower extremity:  Shortened and externally rotated, neurovascularly intact distally, tenderness to palpation at groin     Neurological: She is alert and oriented to person, place, and time. She has normal strength. No cranial nerve deficit. GCS score is 15. GCS eye subscore is  4. GCS verbal subscore is 5. GCS motor subscore is 6.   Skin: Skin is warm. Capillary refill takes less than 2 seconds.   Psychiatric: She has a normal mood and affect. Thought content normal.         ED Course   Procedures  Labs Reviewed   CBC W/ AUTO DIFFERENTIAL - Abnormal; Notable for the following components:       Result Value    WBC 16.80 (*)     RBC 3.51 (*)     Hemoglobin 10.4 (*)     Hematocrit 32.7 (*)     MCHC 31.8 (*)     Immature Granulocytes 0.6 (*)     Gran # (ANC) 14.7 (*)     Immature Grans (Abs) 0.10 (*)     Gran % 87.4 (*)     Lymph % 6.3 (*)     All other components within normal limits   BASIC METABOLIC PANEL - Abnormal; Notable for the following components:    CO2 22 (*)     Glucose 119 (*)     All other components within normal limits   PROTIME-INR   SARS-COV-2 RDRP GENE     EKG Readings: (Independently Interpreted)   6:29 PM Normal Sinus Rhythm with rate of 93. No STEMI. Non specific ST changes.        Imaging Results          X-Ray Hip 2 or 3 views Left (with Pelvis when performed) (Final result)  Result time 11/10/21 18:30:25    Final result by Margarita Marx MD (11/10/21 18:30:25)                 Impression:      Left femoral neck fracture.      Electronically signed by: Margarita Marx  Date:    11/10/2021  Time:    18:30             Narrative:    EXAMINATION:  XR HIP WITH PELVIS, 2 OR THREE VIEWS LEFT    CLINICAL HISTORY:  Unspecified fall, initial encounter    TECHNIQUE:  AP pelvis and two views of the left hip.    COMPARISON:  None.    FINDINGS:  Examination is limited by body habitus.  Two views of the left hip demonstrate an acute fracture of the left femoral neck with associated varus deformity.  Mild degenerative changes are seen at both hip joints.  Bones are diffusely osteopenic.  There are advanced vascular calcifications.                              X-Rays:   Independently Interpreted Readings:   Other Readings:  Left Hip X-Ray: Left hip neck fracture, minimal  displacement. No foreign bodies. No dislocation.     Medications   sodium chloride 0.9% flush 10 mL (has no administration in time range)   fentaNYL 50 mcg/mL injection 50 mcg (50 mcg Intravenous Given 11/10/21 1806)     Medical Decision Making:   History:   Old Medical Records: I decided to obtain old medical records.  Independently Interpreted Test(s):   I have ordered and independently interpreted X-rays - see prior notes.  I have ordered and independently interpreted EKG Reading(s) - see prior notes  Clinical Tests:   Lab Tests: Ordered and Reviewed  Radiological Study: Ordered and Reviewed  Medical Tests: Reviewed and Ordered          Scribe Attestation:   Scribe #1: I performed the above scribed service and the documentation accurately describes the services I performed. I attest to the accuracy of the note.        ED Course as of 11/10/21 2206   Wed Nov 10, 2021   1852 Case discussed with hospital medicine, will admit to Dr. Azul.  Per case management patient meets observation criteria [MA]   1933 Orthopedics paged once more.    [MA]   1938 Case discussed with Dr. Rothman, Ortho, NPO after midnight, will see in am    [MA]      ED Course User Index  [MA] Hamilton Aguillon MD           Physician Attestation for Scribe: I, SRIKANTH Aguillon, reviewed documentation as scribed in my presence, which is both accurate and complete.  Clinical Impression:   Final diagnoses:  [W19.XXXA] Fall  [S72.002A] Closed fracture of left hip, initial encounter (Primary)          ED Disposition Condition    Observation               Hamilton Aguillon MD  11/10/21 1904       Hamilton Aguillon MD  11/10/21 2206

## 2021-11-11 NOTE — PROGRESS NOTES
[facility] Hospital Medicine  Progress Note    Patient Name: Marylu Fenton  MRN: [id number]  Patient Class: OP- Observation   Admission Date: 11/10/2021  Length of Stay: 0 days  Attending Physician: Radha Azul MD  Primary Care Provider: Kathy Steele MD        Subjective:     Principal Problem:Closed left hip fracture        HPI:  Ms. Marylu Fenton is a 89 y.o. female, with PMH of ventral hernia, prior SBO, and OA of the knee, FERD, T2DM, HTN, who was transferring between walker and the toilet today when she had two falls. Her familt was unable to lift her after the first fall and the Fire Department was called, but she fell again after they lifted her into her wheelchair as she tried to get up to use the toilet. After the second fall, she was unable to stand and bear weight and thus EMS was called. Upon EMS arrival her LLE was shortened and rotated upon EMS arrival. She also noted right sided neck pain. She denied head injury or LOC during the falls, and denied light headedness. In the ED, imaging showed a left femoral neck fracture. Per Ortho she [facility] Thursday or Friday. She is placed on OBS.       Overview/Hospital Course:  No notes on file    Interval History:  Patient in bed resting complains of pain waiting OR today    Review of Systems   Constitutional: Negative for activity change, appetite change, chills and fever.   Respiratory: Negative for cough, shortness of breath and wheezing.    Cardiovascular: Negative for chest pain, palpitations and leg swelling.   Gastrointestinal: Negative for abdominal pain, diarrhea, nausea and vomiting.   Genitourinary: Negative for dysuria, flank pain, frequency, hematuria and urgency.   Musculoskeletal: Positive for arthralgias and gait problem. Negative for back pain, joint swelling and neck pain.   Skin: Negative for color change, pallor, rash and wound.   Neurological: Negative for dizziness,  syncope, weakness, light-headedness and numbness.   Psychiatric/Behavioral: Negative for agitation and confusion.     Objective:     Vital Signs (Most Recent):  Temp: 98.7 °F (37.1 °C) (11/11/21 0805)  Pulse: 99 (11/11/21 0805)  Resp: 18 (11/11/21 0926)  BP: 133/79 (11/11/21 0805)  SpO2: 95 % (11/11/21 0805) Vital Signs (24h Range):  Temp:  [98 °F (36.7 °C)-98.7 °F (37.1 °C)] 98.7 °F (37.1 °C)  Pulse:  [] 99  Resp:  [16-23] 18  SpO2:  [93 %-99 %] 95 %  BP: (132-152)/(79-87) 133/79     Weight: 72.1 kg (159 lb)  Body mass index is 24.9 kg/m².    Intake/Output Summary (Last 24 hours) at 11/11/2021 1250  Last data filed at 11/11/2021 1239  Gross per 24 hour   Intake 500 ml   Output 1300 ml   Net -800 ml      Physical Exam  Vitals and nursing note reviewed.   Constitutional:       General: She is not in acute distress.     Appearance: Normal appearance. She is well-developed. She is not ill-appearing or diaphoretic.   HENT:      Head: Normocephalic and atraumatic.   Eyes:      General: No scleral icterus.     Conjunctiva/sclera: Conjunctivae normal.   Neck:      Trachea: No tracheal deviation.   Cardiovascular:      Rate and Rhythm: Normal rate and regular rhythm.      Heart sounds: Normal heart sounds.   Pulmonary:      Effort: Pulmonary effort is normal. No respiratory distress.      Breath sounds: Normal breath sounds. No wheezing.   Abdominal:      General: Bowel sounds are normal. There is no distension.      Palpations: Abdomen is soft.      Tenderness: There is no abdominal tenderness.   Musculoskeletal:         General: Tenderness, deformity (LLE shortened ) and signs of injury present. No swelling. Normal range of motion.      Cervical back: Normal range of motion and neck supple.      Left lower leg: No edema.   Skin:     General: Skin is warm and dry.   Neurological:      Mental Status: She is alert and oriented to person, place, and time.   Psychiatric:         Mood and Affect: Mood normal.          Significant Labs:   All pertinent labs within the past 24 hours have been reviewed.  CBC:   Recent Labs   Lab 11/10/21  1827   WBC 16.80*   HGB 10.4*   HCT 32.7*        CMP:   Recent Labs   Lab 11/10/21  1827      K 3.9      CO2 22*   *   BUN 20   CREATININE 0.8   CALCIUM 9.6   ANIONGAP 14   EGFRNONAA >60       Significant Imaging: I have reviewed all pertinent imaging results/findings within the past 24 hours.   Imaging Results          X-Ray Hip 2 or 3 views Left (with Pelvis when performed) (Final result)  Result time 11/10/21 18:30:25    Final result by Margarita Marx MD (11/10/21 18:30:25)                 Impression:      Left femoral neck fracture.      Electronically signed by: Margarita Marx  Date:    11/10/2021  Time:    18:30             Narrative:    EXAMINATION:  XR HIP WITH PELVIS, 2 OR THREE VIEWS LEFT    CLINICAL HISTORY:  Unspecified fall, initial encounter    TECHNIQUE:  AP pelvis and two views of the left hip.    COMPARISON:  None.    FINDINGS:  Examination is limited by body habitus.  Two views of the left hip demonstrate an acute fracture of the left femoral neck with associated varus deformity.  Mild degenerative changes are seen at both hip joints.  Bones are diffusely osteopenic.  There are advanced vascular calcifications.                                    Assessment/Plan:      * Closed left hip fracture  - Ms. Marylu Fenton presents after 2 witnessed falls at home with left femoral neck fracture  - NPO for OR today  - pain management  - ortho recs    Hyperlipidemia  - continue simvastatin 10 mg QD           Essential hypertension  - normotensive at present   - patient states she no longer takes any blood pressure meds   - monitor       DM II (diabetes mellitus, type II), controlled  - last A1C: 5.4  - states she is not a diabetic  - not on medications        VTE Risk Mitigation (From admission, onward)         Ordered     IP VTE HIGH RISK PATIENT   Once         11/10/21 2047     Place sequential compression device  Until discontinued         11/10/21 2047                Discharge Planning   NOE:      Code Status: Full Code   Is the patient medically ready for discharge?:     Reason for patient still in hospital (select all that apply): Patient trending condition, Treatment and Consult recommendations                     Joyce Boudreaux PA-C  Department of Hospital Medicine   [facility]

## 2021-11-11 NOTE — PLAN OF CARE
[facility]  Initial Discharge Assessment       Primary Care Provider: Kathy Steele MD    Admission Diagnosis: Fall [W19.XXXA]  Closed fracture of left hip, initial encounter [S72.002A]    Admission Date: 11/10/2021  Expected Discharge Date:       Payor: [employer] MEDICARE / Plan: [employer] 65 / Product Type: Medicare Advantage /     Extended Emergency Contact Information  Primary Emergency Contact: Bobbi Yang   [facility]  Home Phone: [phone]  Relation: Daughter  Secondary Emergency Contact: Kuldeep Fenton  Address: [address] [place] [address] [facility]  Home Phone: [phone]  Work Phone: [phone]  Relation: Son      This [facility] team  completed discharge assessment with patient sonKuldeep via telephone.  Son able to verify all demographic information.; updated home phobe number as [phone]  Denies any inpatient admission less than 30 days   [facility] bedside delivery.   Preferred pharmacy:     [facility]/pharmacy #[id number] - [place], [place] - [address] [facility]  [address] [place] [address] [employer] [facility] [address]  Phone: [phone] Fax: [phone]        Denies dialysis care  Denies Coumadin for home use  Denies active home health services; Open to home health disposition, does not want SNF placement; Pending PT recommendations   DME for home use: rolling walker, transport wheelchair. Patient will benefit from bedside commode for home use; Pending PT recommendations  COBIAN explained to patient's son;  Provided information needed on Outpatient Observation Status; verbalizes understanding and gave verbal consent   Patient will have help at home post hospital discharge. Family to provide transportation home at the time of discharge.    11/11/21 1724   Discharge Assessment   Assessment Type Discharge Planning Assessment   Confirmed/corrected address  and phone number on face sheet? Yes, [phone]   Assessment information obtained from? Patient    Prior to hospitalization cognitive status: Alert/Oriented   Prior to hospitalization functional status: Dependent;Assistive Equipment   Current cognitive status: Alert/Oriented    Current Functional Status: Dependent; Assistive Equipment   Arrived From home or self-care   Lives With Spouse    Able to Return to Prior Arrangements Yes    Is patient able to care for self after discharge? No    Provided patient/caregiver education on the expected discharge date and the discharge plan Yes   Do you have any financial concerns preventing you from receiving the healthcare you need? No    Does the patient have transportation to healthcare appointments? Yes   Transportation Available family or friend will provide   Patient's perception of discharge disposition home or self care   Discharge Plan A Home Health     Discharge Plan B Home with family    DME Needed Upon Discharge  Bedside Commode   Patient/Family In Agreement With Plan Yes

## 2021-11-11 NOTE — H&P
[facility] Hospital Medicine  History & Physical    Patient Name: Marylu Fenton  MRN: [id number]  Patient Class: OP- Observation  Admission Date: 11/10/2021  Attending Physician: Radha Azul MD   Primary Care Provider: Kathy Steele MD         Patient information was obtained from patient, past medical records and ER records.     Subjective:     Principal Problem:Closed left hip fracture    Chief Complaint:   Chief Complaint   Patient presents with    Fall     + L hip shortening and rotation per EMS. 12 lead unremarkable. Denies syncopal episode        HPI: Ms. Marylu Fenton is a 89 y.o. female, with PMH of ventral hernia, prior SBO, and OA of the knee, FERD, T2DM, HTN, who was transferring between walker and the toilet today when she had two falls. Her familt was unable to lift her after the first fall and the Fire Department was called, but she fell again after they lifted her into her wheelchair as she tried to get up to use the toilet. After the second fall, she was unable to stand and bear weight and thus EMS was called. Upon EMS arrival her LLE was shortened and rotated upon EMS arrival. She also noted right sided neck pain. She denied head injury or LOC during the falls, and denied light headedness. In the ED, imaging showed a left femoral neck fracture. Per Ortho [facility] Thursday or Friday. She is placed on OBS.       Past Medical History:   Diagnosis Date    DM type 2, controlled, with complication 5/4/2016    Essential hypertension 5/4/2016    GERD (gastroesophageal reflux disease)        Past Surgical History:   Procedure Laterality Date    CHOLECYSTECTOMY      HERNIA REPAIR  04/14/2016    exploratory laparotomy for SBO       Review of patient's allergies indicates:  No Known Allergies    No current facility-administered medications on file prior to encounter.     Current Outpatient Medications on File Prior to Encounter    Medication Sig    diclofenac sodium 1 % Gel Apply topically 2 (two) times daily as needed. (for pain). Apply to both knees and big toes    dorzolamide (TRUSOPT) 2 % ophthalmic solution Place 1 drop into the right eye every morning.    ferrous sulfate 325 (65 FE) MG EC tablet Take 1 tablet (325 mg total) by mouth once daily.    latanoprost 0.005 % ophthalmic solution Place 1 drop into the right eye every evening.    multivitamin (THERAGRAN) tablet Take 1 tablet by mouth once daily.    senna-docusate 8.6-50 mg (PERICOLACE) 8.6-50 mg per tablet Take 1 tablet by mouth 2 (two) times daily.    simvastatin (ZOCOR) 10 MG tablet Take 10 mg by mouth every evening.    amlodipine (NORVASC) 5 MG tablet Take 1 tablet (5 mg total) by mouth once daily.    aspirin (ECOTRIN) 81 MG EC tablet Take 81 mg by mouth once daily.    hydrochlorothiazide (HYDRODIURIL) 25 MG tablet Take 1 tablet (25 mg total) by mouth once daily. HOLD UNTIL TOLD TO RESUME BY PHYSICIAN    hydrocodone-acetaminophen 7.5-325mg (NORCO) 7.5-325 mg per tablet Take 1 tablet by mouth every 6 (six) hours as needed for Pain.    polyethylene glycol (GLYCOLAX) 17 gram PwPk Take 17 g by mouth every other day.    timolol maleate 0.5% (TIMOPTIC) 0.5 % Drop Place 1 drop into the right eye once daily.     Family History    None       Tobacco Use    Smoking status: Never Smoker    Smokeless tobacco: Not on file   Substance and Sexual Activity    Alcohol use: No    Drug use: No    Sexual activity: Not on file     Review of Systems   Constitutional: Negative for activity change, appetite change, chills and fever.   Respiratory: Negative for cough, shortness of breath and wheezing.    Cardiovascular: Negative for chest pain, palpitations and leg swelling.   Gastrointestinal: Negative for abdominal pain, diarrhea, nausea and vomiting.   Genitourinary: Negative for dysuria, flank pain, frequency, hematuria and urgency.   Musculoskeletal: Positive for arthralgias  and gait problem. Negative for back pain, joint swelling and neck pain.   Skin: Negative for color change, pallor, rash and wound.   Neurological: Negative for dizziness, syncope, weakness, light-headedness and numbness.   Psychiatric/Behavioral: Negative for agitation and confusion.     Objective:     Vital Signs (Most Recent):  Temp: 98.2 °F (36.8 °C) (11/11/21 0423)  Pulse: 109 (11/11/21 0423)  Resp: 18 (11/11/21 0423)  BP: 133/84 (11/11/21 0423)  SpO2: 95 % (11/11/21 0423) Vital Signs (24h Range):  Temp:  [98 °F (36.7 °C)-98.2 °F (36.8 °C)] 98.2 °F (36.8 °C)  Pulse:  [] 109  Resp:  [16-23] 18  SpO2:  [93 %-99 %] 95 %  BP: (132-152)/(81-87) 133/84     Weight: 72.1 kg (159 lb)  Body mass index is 24.9 kg/m².    Physical Exam  Vitals and nursing note reviewed.   Constitutional:       General: She is not in acute distress.     Appearance: Normal appearance. She is well-developed, normal weight and well-nourished. She is not ill-appearing or diaphoretic.   HENT:      Head: Normocephalic and atraumatic.   Eyes:      General: No scleral icterus.     Extraocular Movements: EOM normal.      Conjunctiva/sclera: Conjunctivae normal.      Pupils: Pupils are equal, round, and reactive to light.   Neck:      Trachea: No tracheal deviation.   Cardiovascular:      Rate and Rhythm: Normal rate and regular rhythm.      Pulses: Intact distal pulses.      Heart sounds: Normal heart sounds. No murmur heard.  No gallop.    Pulmonary:      Effort: Pulmonary effort is normal. No respiratory distress.      Breath sounds: Normal breath sounds. No stridor. No wheezing or rales.   Abdominal:      General: Bowel sounds are normal. There is no distension.      Palpations: Abdomen is soft. There is no mass.      Tenderness: There is no abdominal tenderness. There is no guarding.   Musculoskeletal:         General: Tenderness (  ), deformity (LLE shortened ) and signs of injury present. No swelling. Normal range of motion.      Cervical  back: Normal range of motion and neck supple.      Left lower leg: No edema.   Skin:     General: Skin is warm and dry.      Coloration: Skin is not pale.      Findings: No erythema or rash.   Neurological:      General: No focal deficit present.      Mental Status: She is alert and oriented to person, place, and time. Mental status is at baseline.      Cranial Nerves: No cranial nerve deficit.      Motor: No abnormal muscle tone.   Psychiatric:         Mood and Affect: Mood and affect and mood normal.         Behavior: Behavior normal.         Thought Content: Thought content normal.         Judgment: Judgment normal.           CRANIAL NERVES     CN III, IV, VI   Pupils are equal, round, and reactive to light.  Extraocular motions are normal.        Significant Labs:   All pertinent labs within the past 24 hours have been reviewed.  BMP:   Recent Labs   Lab 11/10/21  1827   *      K 3.9      CO2 22*   BUN 20   CREATININE 0.8   CALCIUM 9.6     CBC:   Recent Labs   Lab 11/10/21  1827   WBC 16.80*   HGB 10.4*   HCT 32.7*        CMP:   Recent Labs   Lab 11/10/21  1827      K 3.9      CO2 22*   *   BUN 20   CREATININE 0.8   CALCIUM 9.6   ANIONGAP 14   EGFRNONAA >60     Urine Culture: No results for input(s): LABURIN in the last 48 hours.  Urine Studies: No results for input(s): COLORU, APPEARANCEUA, PHUR, SPECGRAV, PROTEINUA, GLUCUA, KETONESU, BILIRUBINUA, OCCULTUA, NITRITE, UROBILINOGEN, LEUKOCYTESUR, RBCUA, WBCUA, BACTERIA, SQUAMEPITHEL, HYALINECASTS in the last 48 hours.    Invalid input(s): SHEREEN    Significant Imaging: I have reviewed all pertinent imaging results/findings within the past 24 hours.   Imaging Results          X-Ray Hip 2 or 3 views Left (with Pelvis when performed) (Final result)  Result time 11/10/21 18:30:25    Final result by Margarita Marx MD (11/10/21 18:30:25)                 Impression:      Left femoral neck fracture.      Electronically  signed by: Margarita Marx  Date:    11/10/2021  Time:    18:30             Narrative:    EXAMINATION:  XR HIP WITH PELVIS, 2 OR THREE VIEWS LEFT    CLINICAL HISTORY:  Unspecified fall, initial encounter    TECHNIQUE:  AP pelvis and two views of the left hip.    COMPARISON:  None.    FINDINGS:  Examination is limited by body habitus.  Two views of the left hip demonstrate an acute fracture of the left femoral neck with associated varus deformity.  Mild degenerative changes are seen at both hip joints.  Bones are diffusely osteopenic.  There are advanced vascular calcifications.                                 Assessment/Plan:     * Closed left hip fracture  - Ms. Marylu Fenton presents after 2 witnessed falls at home with left hip fracture  - [facility]  - Ortho consulted   - PRN pain meds ordered     Hyperlipidemia  - continue simvastatin 10 mg QD   - no lipid panel on file         Essential hypertension  - normotensive at present   - patient states she no longer takes any blood pressure meds   - monitor       DM II (diabetes mellitus, type II), controlled  - last A1C:   Lab Results   Component Value Date    HGBA1C 5.8 04/20/2016   - patient no longer takes diabetes meds    - Diabetic diet   - SSI with accuchecks AC/HS until NPO and then [id number]       VTE Risk Mitigation (From admission, onward)         Ordered     IP VTE HIGH RISK PATIENT  Once         11/10/21 2047     Place sequential compression device  Until discontinued         11/10/21 2047                   Shira Henry PA-C  Department of Hospital Medicine   [facility]

## 2021-11-11 NOTE — ASSESSMENT & PLAN NOTE
- normotensive at present   - patient states she no longer takes any blood pressure meds   - monitor

## 2021-11-11 NOTE — ASSESSMENT & PLAN NOTE
- Ms. Marylu KELLEY Jossy presents after 2 witnessed falls at home with left hip fracture  - [facility]  - Ortho consulted   - PRN pain meds ordered

## 2021-11-11 NOTE — HPI
"Per TRACY Doyle:  "Ms. Marylu Fenton is a 89 y.o. female, with PMH of ventral hernia, prior SBO, and OA of the knee, FERD, T2DM, HTN, who was transferring between walker and the toilet today when she had two falls. Her familt was unable to lift her after the first fall and the Fire Department was called, but she fell again after they lifted her into her wheelchair as she tried to get up to use the toilet. After the second fall, she was unable to stand and bear weight and thus EMS was called. Upon EMS arrival her LLE was shortened and rotated upon EMS arrival. She also noted right sided neck pain. She denied head injury or LOC during the falls, and denied light headedness. In the ED, imaging showed a left femoral neck fracture. Per Ortho she is to [facility] Thursday or Friday. She is placed on OBS. "  "

## 2021-11-11 NOTE — PROGRESS NOTES
Formal consult to follow    Planned left hip hemiarthoplasty tomorrow or Friday    NPO after midnight

## 2021-11-12 LAB
ANION GAP SERPL CALC-SCNC: 10 MMOL/L (ref 8–16)
BASOPHILS # BLD AUTO: 0.03 K/UL (ref 0–0.2)
BASOPHILS NFR BLD: 0.2 % (ref 0–1.9)
BUN SERPL-MCNC: 13 MG/DL (ref 8–23)
CALCIUM SERPL-MCNC: 8.5 MG/DL (ref 8.7–10.5)
CHLORIDE SERPL-SCNC: 105 MMOL/L (ref 95–110)
CO2 SERPL-SCNC: 22 MMOL/L (ref 23–29)
CREAT SERPL-MCNC: 0.8 MG/DL (ref 0.5–1.4)
DIFFERENTIAL METHOD: ABNORMAL
EOSINOPHIL # BLD AUTO: 0.1 K/UL (ref 0–0.5)
EOSINOPHIL NFR BLD: 0.3 % (ref 0–8)
ERYTHROCYTE [DISTWIDTH] IN BLOOD BY AUTOMATED COUNT: 13 % (ref 11.5–14.5)
EST. GFR  (AFRICAN AMERICAN): >60 ML/MIN/1.73 M^2
EST. GFR  (NON AFRICAN AMERICAN): >60 ML/MIN/1.73 M^2
GLUCOSE SERPL-MCNC: 147 MG/DL (ref 70–110)
HCT VFR BLD AUTO: 29.3 % (ref 37–48.5)
HGB BLD-MCNC: 9.4 G/DL (ref 12–16)
IMM GRANULOCYTES # BLD AUTO: 0.1 K/UL (ref 0–0.04)
IMM GRANULOCYTES NFR BLD AUTO: 0.6 % (ref 0–0.5)
LYMPHOCYTES # BLD AUTO: 1.3 K/UL (ref 1–4.8)
LYMPHOCYTES NFR BLD: 7.7 % (ref 18–48)
MCH RBC QN AUTO: 30 PG (ref 27–31)
MCHC RBC AUTO-ENTMCNC: 32.1 G/DL (ref 32–36)
MCV RBC AUTO: 94 FL (ref 82–98)
MONOCYTES # BLD AUTO: 1.4 K/UL (ref 0.3–1)
MONOCYTES NFR BLD: 8.4 % (ref 4–15)
NEUTROPHILS # BLD AUTO: 14.3 K/UL (ref 1.8–7.7)
NEUTROPHILS NFR BLD: 82.8 % (ref 38–73)
NRBC BLD-RTO: 0 /100 WBC
PLATELET # BLD AUTO: 142 K/UL (ref 150–450)
PMV BLD AUTO: 13 FL (ref 9.2–12.9)
POCT GLUCOSE: 153 MG/DL (ref 70–110)
POCT GLUCOSE: 178 MG/DL (ref 70–110)
POCT GLUCOSE: 187 MG/DL (ref 70–110)
POTASSIUM SERPL-SCNC: 4.2 MMOL/L (ref 3.5–5.1)
RBC # BLD AUTO: 3.13 M/UL (ref 4–5.4)
SODIUM SERPL-SCNC: 137 MMOL/L (ref 136–145)
WBC # BLD AUTO: 17.2 K/UL (ref 3.9–12.7)

## 2021-11-12 PROCEDURE — 25000003 PHARM REV CODE 250: Performed by: PHYSICIAN ASSISTANT

## 2021-11-12 PROCEDURE — 25000003 PHARM REV CODE 250: Performed by: ORTHOPAEDIC SURGERY

## 2021-11-12 PROCEDURE — 94761 N-INVAS EAR/PLS OXIMETRY MLT: CPT

## 2021-11-12 PROCEDURE — 97530 THERAPEUTIC ACTIVITIES: CPT

## 2021-11-12 PROCEDURE — 36415 COLL VENOUS BLD VENIPUNCTURE: CPT | Performed by: PHYSICIAN ASSISTANT

## 2021-11-12 PROCEDURE — 97162 PT EVAL MOD COMPLEX 30 MIN: CPT

## 2021-11-12 PROCEDURE — 99233 SBSQ HOSP IP/OBS HIGH 50: CPT | Mod: ,,, | Performed by: PHYSICIAN ASSISTANT

## 2021-11-12 PROCEDURE — 97165 OT EVAL LOW COMPLEX 30 MIN: CPT

## 2021-11-12 PROCEDURE — 80048 BASIC METABOLIC PNL TOTAL CA: CPT | Performed by: PHYSICIAN ASSISTANT

## 2021-11-12 PROCEDURE — 11000001 HC ACUTE MED/SURG PRIVATE ROOM

## 2021-11-12 PROCEDURE — 27000221 HC OXYGEN, UP TO 24 HOURS

## 2021-11-12 PROCEDURE — 63600175 PHARM REV CODE 636 W HCPCS: Performed by: PHYSICIAN ASSISTANT

## 2021-11-12 PROCEDURE — 99233 PR SUBSEQUENT HOSPITAL CARE,LEVL III: ICD-10-PCS | Mod: ,,, | Performed by: PHYSICIAN ASSISTANT

## 2021-11-12 PROCEDURE — 85025 COMPLETE CBC W/AUTO DIFF WBC: CPT | Performed by: PHYSICIAN ASSISTANT

## 2021-11-12 PROCEDURE — G0378 HOSPITAL OBSERVATION PER HR: HCPCS

## 2021-11-12 PROCEDURE — 94799 UNLISTED PULMONARY SVC/PX: CPT

## 2021-11-12 PROCEDURE — 63600175 PHARM REV CODE 636 W HCPCS: Performed by: ORTHOPAEDIC SURGERY

## 2021-11-12 RX ORDER — NAPROXEN SODIUM 220 MG/1
81 TABLET, FILM COATED ORAL 2 TIMES DAILY
Status: DISCONTINUED | OUTPATIENT
Start: 2021-11-12 | End: 2021-11-18 | Stop reason: HOSPADM

## 2021-11-12 RX ADMIN — ASPIRIN 81 MG CHEWABLE TABLET 81 MG: 81 TABLET CHEWABLE at 12:11

## 2021-11-12 RX ADMIN — CEFAZOLIN SODIUM 2 G: 2 SOLUTION INTRAVENOUS at 11:11

## 2021-11-12 RX ADMIN — DEXTROSE AND SODIUM CHLORIDE: 5; .9 INJECTION, SOLUTION INTRAVENOUS at 04:11

## 2021-11-12 RX ADMIN — CEFAZOLIN SODIUM 2 G: 2 SOLUTION INTRAVENOUS at 02:11

## 2021-11-12 RX ADMIN — LIDOCAINE 5% 1 PATCH: 700 PATCH TOPICAL at 04:11

## 2021-11-12 RX ADMIN — ASPIRIN 81 MG CHEWABLE TABLET 81 MG: 81 TABLET CHEWABLE at 08:11

## 2021-11-12 RX ADMIN — MORPHINE SULFATE 4 MG: 4 INJECTION INTRAVENOUS at 11:11

## 2021-11-12 RX ADMIN — POLYETHYLENE GLYCOL 3350 17 G: 17 POWDER, FOR SOLUTION ORAL at 11:11

## 2021-11-12 NOTE — PLAN OF CARE
Initial assessment completed by NIURKA Dee on 11/11/2021 at 1724  RN CM Sup- agrees with assessment       11/12/21 1437   Discharge Assessment   Assessment Type Discharge Planning Assessment       [facility]  Initial Discharge Assessment        Primary Care Provider: Kathy Steele MD     Admission Diagnosis: Fall [W19.XXXA]  Closed fracture of left hip, initial encounter [S72.002A]     Admission Date: 11/10/2021  Expected Discharge Date:         Payor: [employer] MEDICARE / Plan: [employer] 65 / Product Type: Medicare Advantage /      Extended Emergency Contact Information  Primary Emergency Contact: Bobbi Yang   [facility]  Home Phone: [phone]  Relation: Daughter  Secondary Emergency Contact: Kuldeep Fenton  Address: [address] [place] [address] [facility]  Home Phone: [phone]  Work Phone: [phone]  Relation: Son        This [facility] team  completed discharge assessment with patient sonKuldeep via telephone.  Son able to verify all demographic information.; updated home phobe number as [phone]  Denies any inpatient admission less than 30 days   [facility] bedside delivery.   Preferred pharmacy:      [facility]/pharmacy #[id number] - [place], [place] - [address] [facility]  [address] [address]  Phone: [phone] Fax: [phone]           Denies dialysis care  Denies Coumadin for home use  Denies active home health services; Open to home health disposition, does not want SNF placement; Pending PT recommendations   DME for home use: rolling walker, transport wheelchair. Patient will benefit from bedside commode for home use; Pending PT recommendations  COBIAN explained to patient's son;  Provided information needed on Outpatient Observation Status; verbalizes understanding and gave verbal consent   Patient will have help at  home post hospital discharge. Family to provide transportation home at the time of discharge.     11/11/21 1724   Discharge Assessment   Assessment Type Discharge Planning Assessment   Confirmed/corrected address and phone number on face sheet? Yes, [phone]   Assessment information obtained from? Patient    Prior to hospitalization cognitive status: Alert/Oriented   Prior to hospitalization functional status: Dependent;Assistive Equipment   Current cognitive status: Alert/Oriented    Current Functional Status: Dependent; Assistive Equipment   Arrived From home or self-care   Lives With Spouse    Able to Return to Prior Arrangements Yes    Is patient able to care for self after discharge? No    Provided patient/caregiver education on the expected discharge date and the discharge plan Yes   Do you have any financial concerns preventing you from receiving the healthcare you need? No    Does the patient have transportation to healthcare appointments? Yes   Transportation Available family or friend will provide   Patient's perception of discharge disposition home or self care   Discharge Plan A Home Health     Discharge Plan B Home with family    DME Needed Upon Discharge  Bedside Commode   Patient/Family In Agreement With Plan Yes

## 2021-11-12 NOTE — PLAN OF CARE
Problem: Occupational Therapy Goal  Goal: Occupational Therapy Goal  Description: Goals to be met by: 11/19/2021     Patient will increase functional independence with ADLs by performing:    UE Dressing with Minimal Assistance.  LE Dressing with Moderate Assistance and Assistive Devices as needed.  Grooming while EOB with Supervision.  Sitting at edge of bed x15 minutes with Supervision without use of Ues   Toilet transfer to bedside commode with Moderate Assistance.    Outcome: Ongoing, Progressing   OT eval completed with appropriate goals & POC established.  Pt requiring total A for mobility and limited ADL performance as unable to offload BUEs during EOB sitting. Pt limited by pain. Recommend continued OT services with SNF to maximize safe indep during all daily tasks.

## 2021-11-12 NOTE — PT/OT/SLP EVAL
Physical Therapy Evaluation    Patient Name:  Marylu Fenton   MRN:  [id number]    Recommendations:     Discharge Recommendations:  nursing facility, skilled   Discharge Equipment Recommendations: wheelchair,hospital bed   Barriers to discharge: Inaccessible home and Decreased caregiver support    Assessment:     Marylu Fenton is a 89 y.o. female admitted with a medical diagnosis of Closed left hip fracture. She is s/p left hemiarthroplasty. She has a past medical history that includes but is not limited to T2DM, HTN, HLD, SBO, GERD, debility, anemia, and ventral hernia. She presents with the following impairments/functional limitations:  weakness,impaired endurance,impaired self care skills,impaired functional mobilty,gait instability,impaired balance,decreased lower extremity function,pain,decreased ROM,impaired joint extensibility,orthopedic precautions.    PT orders received. PT evaluation completed and goals/POC established. Pt tolerated evaluation well with no adverse reactions. Pt performed bed mobility and attempted transfers with total assistance of 2 clinicians. PT will continue to follow and progress goals as tolerated. Recommend discharge to SNF.     Rehab Prognosis: Good; patient would benefit from acute skilled PT services to address these deficits and reach maximum level of function.    Recent Surgery: Procedure(s) (LRB):  HEMIARTHROPLASTY, HIP (Left) 1 Day Post-Op    Plan:     During this hospitalization, patient to be seen 5 x/week to address the identified rehab impairments via gait training,therapeutic exercises,therapeutic activities,neuromuscular re-education and progress toward the following goals:    · Plan of Care Expires:  12/12/21    Subjective     Chief Complaint: left hip pain  Patient/Family Comments/goals: Pt eager to return to PLOF / home.   Pain/Comfort:  · Pain Rating 1: 8/10  · Location - Side 1: Left  · Location - Orientation 1: generalized  · Location 1: hip  · Pain Addressed  1: Reposition,Distraction,Cessation of Activity  · Pain Rating Post-Intervention 1: 8/10    Patients cultural, spiritual, Evangelical conflicts given the current situation: no    Living Environment:  Pt lives with  in a H with 5 YESENIA (bilateral handrails available). She has access to a tub-shower combination in the home.   Prior to admission, patients level of function was modified independent and ambulating with a RW. She also spends some time in a transport wheelchair within the home.  Equipment used at home: bedside commode,walker, rolling,shower chair (transport WC).  DME owned (not currently used): none.  Upon discharge, patient will have assistance from  and daughter.    Objective:     Patient found HOB elevated with bed alarm,peripheral IV,oxygen,SCD,PureWick  upon PT entry to room.    General Precautions: Standard, fall   Orthopedic Precautions:LLE anterior precautions,LLE weight bearing as tolerated   Braces: N/A  Respiratory Status:  · Flow (L/min): 2  · O2 Device (Oxygen Therapy): nasal cannula    Exams:  · Cognition:   · Patient is oriented to self, place, time and situation.  · Pt follows approximately 100% of simple commands.    · Mood: Pleasant and cooperative.   · Safety Awareness: impaired  · Musculoskeletal:  · Posture:  Forward flexed and leaning to R side in order to offload left hip  · LE ROM/Strength:   · R ROM: WFL  · L ROM: limited  · R Strength:   · Hip flexion: 4/5  · Knee extension: 4/5  · Dorsiflexion: 4/5   · L Strength:   · Limited   · Neuromuscular:  · Sensation: Intact to light touch bilateral LEs.   · Tone/Reflexes: No impairments identified with functional mobility. No formal testing performed.   · Balance:   · Static sitting: SBA  · Dynamic sitting: SBA  · Static standing: NT  · Dynamic standing: NT  · Visual-vestibular: No impairments identified with functional mobility. No formal testing performed.  · Integument: Visible skin intact  · Cardiopulmonary:  · Edema:  none noted     Functional Mobility:  · Bed Mobility:     · Supine to Sit: total assistance of 2 persons  · Sit to Supine: total assistance of 2 persons  · Transfers:     · Sit to Stand:  total assistance of 2 persons with hand-held assist - attempted, however, unable to achieve full clearance of bottom from bed.     Therapeutic Activities and Exercises:  Bed mobility and transfer training as described above.    AM-PAC 6 CLICK MOBILITY  Total Score:8     PT educated patient re:   · PT plan of care/role of PT  · WBAT LLE  · Fall and safety precautions  · Gait deviations  · Use of call light (don't get up without assistance)  Pt verbalized understanding, however, needs reinforcement.     Patient left HOB elevated with all lines intact, call button in reach and bed alarm on.    GOALS:   Multidisciplinary Problems     Physical Therapy Goals        Problem: Physical Therapy Goal    Goal Priority Disciplines Outcome Goal Variances Interventions   Physical Therapy Goal     PT, PT/OT Ongoing, Progressing     Description: Goals to be met by: 12/12/21    Patient will perform the following to increase strength, improve mobility, and return to prior level of function:    1. Rolling to L and R with min A  2. Supine <> sit with min A.  3. Sit EOB x 15 min with SBA for balance.  4. Transfer sit <> stand with min A and LRAD.                    History:     Past Medical History:   Diagnosis Date    DM type 2, controlled, with complication 5/4/2016    Essential hypertension 5/4/2016    GERD (gastroesophageal reflux disease)        Past Surgical History:   Procedure Laterality Date    CHOLECYSTECTOMY      HEMIARTHROPLASTY OF HIP Left 11/11/2021    Procedure: HEMIARTHROPLASTY, HIP;  Surgeon: Jose Quintana MD;  [facility];  Service: Orthopedics;  Laterality: Left;    HERNIA REPAIR  04/14/2016    exploratory laparotomy for SBO       Time Tracking:     PT Received On: 11/12/21  PT Start Time: 1054     PT Stop Time:  1118  PT Total Time (min): 24 min     Billable Minutes: Evaluation 16 and Therapeutic Activity 8   Overlap with OT for portions of session due to complex nature of patient and for safety with mobility to decrease fall risk for patient and caregiver injury requiring two skilled therapists to provide interventions.       11/12/2021

## 2021-11-12 NOTE — PROGRESS NOTES
Postop day 1. Left endoprosthesis cemented.  Slow with mobilization to be expected.  She is 89. Mobilize as tolerated plan skilled nursing.  Pain controlled well neurovascular intact good length

## 2021-11-12 NOTE — SUBJECTIVE & OBJECTIVE
Interval History: POD #1 left hip hemiarthroplasty patient states pain better controlled tolerated clear liquids this morning no nausea or vomiting; + flatus    Review of Systems   Constitutional: Negative for chills and fever.   HENT: Negative for congestion.    Eyes: Negative.    Respiratory: Negative for cough.    Cardiovascular: Negative for chest pain.   Gastrointestinal: Negative for abdominal distention, abdominal pain, diarrhea, nausea and vomiting.   Genitourinary: Negative for difficulty urinating and dysuria.   Musculoskeletal: Positive for arthralgias.   Skin: Negative.    Neurological: Negative for dizziness, light-headedness and headaches.     Objective:     Vital Signs (Most Recent):  Temp: 98 °F (36.7 °C) (11/12/21 1138)  Pulse: (!) 112 (11/12/21 1138)  Resp: (!) 22 (11/12/21 1159)  BP: (!) 157/86 (11/12/21 1138)  SpO2: (!) 87 % (11/12/21 1138) Vital Signs (24h Range):  Temp:  [97.5 °F (36.4 °C)-99.4 °F (37.4 °C)] 98 °F (36.7 °C)  Pulse:  [] 112  Resp:  [15-22] 22  SpO2:  [87 %-98 %] 87 %  BP: (117-157)/(62-86) 157/86     Weight: 72.1 kg (159 lb)  Body mass index is 24.9 kg/m².    Intake/Output Summary (Last 24 hours) at 11/12/2021 1201  Last data filed at 11/12/2021 0600  Gross per 24 hour   Intake 1100 ml   Output 1100 ml   Net 0 ml      Physical Exam  Vitals and nursing note reviewed.   Constitutional:       General: She is not in acute distress.     Appearance: Normal appearance. She is well-developed. She is not ill-appearing or diaphoretic.   HENT:      Head: Normocephalic and atraumatic.   Eyes:      General: No scleral icterus.     Conjunctiva/sclera: Conjunctivae normal.   Neck:      Trachea: No tracheal deviation.   Cardiovascular:      Rate and Rhythm: Normal rate and regular rhythm.      Heart sounds: Normal heart sounds.   Pulmonary:      Effort: Pulmonary effort is normal. No respiratory distress.      Breath sounds: Normal breath sounds. No wheezing.   Abdominal:      General:  Bowel sounds are normal. There is no distension.      Palpations: Abdomen is soft.      Tenderness: There is no abdominal tenderness.   Musculoskeletal:      Cervical back: Normal range of motion and neck supple.      Comments: With left hip dressing in place, C/D/I  NVI   Skin:     General: Skin is warm and dry.   Neurological:      Mental Status: She is alert and oriented to person, place, and time.   Psychiatric:         Mood and Affect: Mood normal.         Significant Labs:   All pertinent labs within the past 24 hours have been reviewed.  CBC:   Recent Labs   Lab 11/10/21  1827 11/12/21  0436   WBC 16.80* 17.20*   HGB 10.4* 9.4*   HCT 32.7* 29.3*    142*     CMP:   Recent Labs   Lab 11/10/21  1827 11/12/21  0436    137   K 3.9 4.2    105   CO2 22* 22*   * 147*   BUN 20 13   CREATININE 0.8 0.8   CALCIUM 9.6 8.5*   ANIONGAP 14 10   EGFRNONAA >60 >60       Significant Imaging: I have reviewed all pertinent imaging results/findings within the past 24 hours.   Imaging Results          X-Ray Hip 2 or 3 views Left (with Pelvis when performed) (Final result)  Result time 11/10/21 18:30:25    Final result by Margarita Marx MD (11/10/21 18:30:25)                 Impression:      Left femoral neck fracture.      Electronically signed by: Margarita Marx  Date:    11/10/2021  Time:    18:30             Narrative:    EXAMINATION:  XR HIP WITH PELVIS, 2 OR THREE VIEWS LEFT    CLINICAL HISTORY:  Unspecified fall, initial encounter    TECHNIQUE:  AP pelvis and two views of the left hip.    COMPARISON:  None.    FINDINGS:  Examination is limited by body habitus.  Two views of the left hip demonstrate an acute fracture of the left femoral neck with associated varus deformity.  Mild degenerative changes are seen at both hip joints.  Bones are diffusely osteopenic.  There are advanced vascular calcifications.

## 2021-11-12 NOTE — PLAN OF CARE
Problem: Physical Therapy Goal  Goal: Physical Therapy Goal  Description: Goals to be met by: 12/12/21    Patient will perform the following to increase strength, improve mobility, and return to prior level of function:    1. Rolling to L and R with min A  2. Supine <> sit with min A.  3. Sit EOB x 15 min with SBA for balance.  4. Transfer sit <> stand with min A and LRAD.   Outcome: Ongoing, Progressing     PT orders received. PT evaluation completed and goals/POC established. Pt tolerated evaluation well with no adverse reactions. Pt performed bed mobility and attempted transfers with total assistance of 2 clinicians. PT will continue to follow and progress goals as tolerated. Recommend discharge to SNF.

## 2021-11-12 NOTE — PT/OT/SLP EVAL
Occupational Therapy   Evaluation    Name: Marylu Fenton  MRN: [id number]  Admitting Diagnosis:  Closed left hip fracture  Recent Surgery: Procedure(s) (LRB):  HEMIARTHROPLASTY, HIP (Left) 1 Day Post-Op    Recommendations:     Discharge Recommendations: nursing facility, skilled  Discharge Equipment Recommendations:  wheelchair,hospital bed  Barriers to discharge:  Decreased caregiver support    Assessment:     Marylu Fenton is a 89 y.o. female with a medical diagnosis of Closed left hip fracture s/p L MANUEL. She presents hesitant for therapy d/t anticipated pain. Performance deficits affecting function: impaired endurance,weakness,impaired self care skills,impaired functional mobilty,impaired balance,decreased upper extremity function,decreased lower extremity function,pain,orthopedic precautions.      OT eval completed with appropriate goals & POC established.  Pt requiring total A for mobility and limited ADL performance as unable to offload BUEs during EOB sitting. Pt limited by pain. Recommend continued OT services with SNF to maximize safe indep during all daily tasks.     Rehab Prognosis: Fair; patient would benefit from acute skilled OT services to address these deficits and reach maximum level of function.       Plan:     Patient to be seen 5 x/week to address the above listed problems via self-care/home management,therapeutic activities,therapeutic exercises  · Plan of Care Expires: 12/12/21  · Plan of Care Reviewed with: patient    Subjective     Chief Complaint: pain   Patient/Family Comments/goals: none stated     Occupational Profile:  Living Environment:  Pt lives in single level home with spouse  Previous level of function: Mod I   Roles and Routines: cares for self   Equipment Used at Home:  walker, rolling,bedside commode,shower chair (transport chair)  Assistance upon Discharge: 89 yo spouse with limited ability to assist     Pain/Comfort:  · Pain Rating 1: 8/10  · Location - Side 1:  "Left  · Location - Orientation 1: generalized  · Location 1: hip  · Pain Addressed 1: Reposition,Distraction,Cessation of Activity  · Pain Rating Post-Intervention 1: 8/10    Patients cultural, spiritual, Christianity conflicts given the current situation: no    Objective:     Communicated with: CATHY Gay prior to session.  Patient found HOB elevated with oxygen,bed alarm,SCD,PureWick,peripheral IV upon OT entry to room.    General Precautions: Standard, fall   Orthopedic Precautions:LLE anterior precautions,LLE weight bearing as tolerated   Braces: N/A  Respiratory Status::   · Flow (L/min): 2  · O2 Device (Oxygen Therapy): nasal cannula    Occupational Performance:    Bed Mobility:    · Patient completed Rolling/Turning to Left with  total assistance  · Patient completed Rolling/Turning to Right with total assistance  · Patient completed Scooting/Bridging with dependent, 2 persons and drawsheet to HOB  · Patient completed Supine to Sit with total assistance and 2 persons  · Patient completed Sit to Supine with total assistance and 2 persons    Functional Mobility/Transfers:  · EOB sitting ~10" SBA  · Attempted sit<>stand twice and unable to come to full stand     Activities of Daily Living:  · Grooming: stand by assistance wash face seated EOB  · Lower Body Dressing: total assistance don socks  · Toileting: total assistance molly care     Cognitive/Visual Perceptual:  Cognitive/Psychosocial Skills:      Oriented to: Person, Place, Time and Situation    Follows Commands/attention:100% verbal commands   Safety awareness/insight to disability: intact       Physical Exam:   Upper Extremity Range of Motion:     -       Right Upper Extremity: WFL  -       Left Upper Extremity: WFL  Upper Extremity Strength:    -       Right Upper Extremity: WFL  -       Left Upper Extremity: WFL   Strength:    -       Right Upper Extremity: WFL  -       Left Upper Extremity: WFL     AMPAC 6 Click ADL:  AMPAC Total Score: " 9    Treatment & Education:  Pt participated in OT tx session with ADLS & functional mobility performed as documented above.  Education provided on role of OT including safe performance of self-care tasks, mobility techniques, safe use of DME, and encouragement of mobilization during hospitalization to prevent/limit deconditioning as well as discharge recommendations   Education:    Patient left with bed in chair position with all lines intact, call button in reach and bed alarm on    GOALS:   Multidisciplinary Problems     Occupational Therapy Goals        Problem: Occupational Therapy Goal    Goal Priority Disciplines Outcome Interventions   Occupational Therapy Goal     OT, PT/OT Ongoing, Progressing    Description: Goals to be met by: 11/19/2021     Patient will increase functional independence with ADLs by performing:    UE Dressing with Minimal Assistance.  LE Dressing with Moderate Assistance and Assistive Devices as needed.  Grooming while EOB with Supervision.  Sitting at edge of bed x15 minutes with Supervision without use of Ues   Toilet transfer to bedside commode with Moderate Assistance.                     History:     Past Medical History:   Diagnosis Date    DM type 2, controlled, with complication 5/4/2016    Essential hypertension 5/4/2016    GERD (gastroesophageal reflux disease)        Past Surgical History:   Procedure Laterality Date    CHOLECYSTECTOMY      HEMIARTHROPLASTY OF HIP Left 11/11/2021    Procedure: HEMIARTHROPLASTY, HIP;  Surgeon: Jose Quintana MD;  Location: [facility];  Service: Orthopedics;  Laterality: Left;    HERNIA REPAIR  04/14/2016    exploratory laparotomy for SBO       Time Tracking:     OT Date of Treatment: 11/12/21  OT Start Time: 1053  OT Stop Time: 1118  OT Total Time (min): 25 min    Billable Minutes:Evaluation 15  Therapeutic Activity 10    11/12/2021

## 2021-11-12 NOTE — ASSESSMENT & PLAN NOTE
- Ms. Nunna WARREN Fenton presents after 2 witnessed falls at home with left femoral neck fracture  - POD #1 s/p left hip hemiarthroplasty  - pain management, DVT prophylaxis ASA 81 mg BID  - PT/OT  - orthopedic recs noted  -  consulted for SNF

## 2021-11-12 NOTE — HOSPITAL COURSE
89-year-old female admitted status post fall at home with displaced left femoral neck fracture.  Orthopedics was consulted patient underwent left hip hemiarthroplasty.

## 2021-11-12 NOTE — PROGRESS NOTES
[facility] Hospital Medicine  Progress Note    Patient Name: Marylu Fenton  MRN: [id number]  Patient Class: IP- Inpatient   Admission Date: 11/10/2021  Length of Stay: 1 days  Attending Physician: Radha Azul MD  Primary Care Provider: Kathy Steele MD        Subjective:     Principal Problem:Closed left hip fracture        HPI:  Ms. Marylu Fenton is a 89 y.o. female, with PMH of ventral hernia, prior SBO, and OA of the knee, FERD, T2DM, HTN, who was transferring between walker and the toilet today when she had two falls. Her familt was unable to lift her after the first fall and the Fire Department was called, but she fell again after they lifted her into her wheelchair as she tried to get up to use the toilet. After the second fall, she was unable to stand and bear weight and thus EMS was called. Upon EMS arrival her LLE was shortened and rotated upon EMS arrival. She also noted right sided neck pain. She denied head injury or LOC during the falls, and denied light headedness. In the ED, imaging showed a left femoral neck fracture. Per Ortho she is to be NPO at [facility] for surgical fixation Thursday or Friday. She is placed on OBS.       Overview/Hospital Course:  89-year-old female admitted status post fall at home with displaced left femoral neck fracture.  Orthopedics was consulted patient underwent left hip hemiarthroplasty.      Interval History: POD #1 left hip hemiarthroplasty patient states pain better controlled tolerated clear liquids this morning no nausea or vomiting; + flatus    Review of Systems   Constitutional: Negative for chills and fever.   HENT: Negative for congestion.    Eyes: Negative.    Respiratory: Negative for cough.    Cardiovascular: Negative for chest pain.   Gastrointestinal: Negative for abdominal distention, abdominal pain, diarrhea, nausea and vomiting.   Genitourinary: Negative for difficulty urinating and dysuria.   Musculoskeletal: Positive for  arthralgias.   Skin: Negative.    Neurological: Negative for dizziness, light-headedness and headaches.     Objective:     Vital Signs (Most Recent):  Temp: 98 °F (36.7 °C) (11/12/21 1138)  Pulse: (!) 112 (11/12/21 1138)  Resp: (!) 22 (11/12/21 1159)  BP: (!) 157/86 (11/12/21 1138)  SpO2: (!) 87 % (11/12/21 1138) Vital Signs (24h Range):  Temp:  [97.5 °F (36.4 °C)-99.4 °F (37.4 °C)] 98 °F (36.7 °C)  Pulse:  [] 112  Resp:  [15-22] 22  SpO2:  [87 %-98 %] 87 %  BP: (117-157)/(62-86) 157/86     Weight: 72.1 kg (159 lb)  Body mass index is 24.9 kg/m².    Intake/Output Summary (Last 24 hours) at 11/12/2021 1201  Last data filed at 11/12/2021 0600  Gross per 24 hour   Intake 1100 ml   Output 1100 ml   Net 0 ml      Physical Exam  Vitals and nursing note reviewed.   Constitutional:       General: She is not in acute distress.     Appearance: Normal appearance. She is well-developed. She is not ill-appearing or diaphoretic.   HENT:      Head: Normocephalic and atraumatic.   Eyes:      General: No scleral icterus.     Conjunctiva/sclera: Conjunctivae normal.   Neck:      Trachea: No tracheal deviation.   Cardiovascular:      Rate and Rhythm: Normal rate and regular rhythm.      Heart sounds: Normal heart sounds.   Pulmonary:      Effort: Pulmonary effort is normal. No respiratory distress.      Breath sounds: Normal breath sounds. No wheezing.   Abdominal:      General: Bowel sounds are normal. There is no distension.      Palpations: Abdomen is soft.      Tenderness: There is no abdominal tenderness.   Musculoskeletal:      Cervical back: Normal range of motion and neck supple.      Comments: With left hip dressing in place, C/D/I  NVI   Skin:     General: Skin is warm and dry.   Neurological:      Mental Status: She is alert and oriented to person, place, and time.   Psychiatric:         Mood and Affect: Mood normal.         Significant Labs:   All pertinent labs within the past 24 hours have been reviewed.  CBC:    Recent Labs   Lab 11/10/21  1827 11/12/21  0436   WBC 16.80* 17.20*   HGB 10.4* 9.4*   HCT 32.7* 29.3*    142*     CMP:   Recent Labs   Lab 11/10/21  1827 11/12/21  0436    137   K 3.9 4.2    105   CO2 22* 22*   * 147*   BUN 20 13   CREATININE 0.8 0.8   CALCIUM 9.6 8.5*   ANIONGAP 14 10   EGFRNONAA >60 >60       Significant Imaging: I have reviewed all pertinent imaging results/findings within the past 24 hours.   Imaging Results          X-Ray Hip 2 or 3 views Left (with Pelvis when performed) (Final result)  Result time 11/10/21 18:30:25    Final result by Margarita Marx MD (11/10/21 18:30:25)                 Impression:      Left femoral neck fracture.      Electronically signed by: Margarita Marx  Date:    11/10/2021  Time:    18:30             Narrative:    EXAMINATION:  XR HIP WITH PELVIS, 2 OR THREE VIEWS LEFT    CLINICAL HISTORY:  Unspecified fall, initial encounter    TECHNIQUE:  AP pelvis and two views of the left hip.    COMPARISON:  None.    FINDINGS:  Examination is limited by body habitus.  Two views of the left hip demonstrate an acute fracture of the left femoral neck with associated varus deformity.  Mild degenerative changes are seen at both hip joints.  Bones are diffusely osteopenic.  There are advanced vascular calcifications.                                    Assessment/Plan:      * Closed left hip fracture  - Ms. Marylu Fenton presents after 2 witnessed falls at home with left femoral neck fracture  - POD #1 s/p left hip hemiarthroplasty  - pain management, DVT prophylaxis ASA 81 mg BID  - PT/OT  - orthopedic recs noted  -  consulted for SNF    Hyperlipidemia  - continue simvastatin 10 mg QD           Essential hypertension  - normotensive at present   - patient states she no longer takes any blood pressure meds   - monitor       DM II (diabetes mellitus, type II), controlled  - last A1C: 5.4  - states she is not a diabetic  - not on  medications        VTE Risk Mitigation (From admission, onward)         Ordered     IP VTE HIGH RISK PATIENT  Once         11/10/21 2047     Place sequential compression device  Until discontinued         11/10/21 2047                Discharge Planning   NOE:      Code Status: Full Code   Is the patient medically ready for discharge?:     Reason for patient still in hospital (select all that apply): Patient trending condition and PT / OT recommendations                     Joyce Boudreaux PA-C  Department of Hospital Medicine   [facility]

## 2021-11-13 LAB
ANION GAP SERPL CALC-SCNC: 9 MMOL/L (ref 8–16)
BASOPHILS # BLD AUTO: 0.03 K/UL (ref 0–0.2)
BASOPHILS NFR BLD: 0.1 % (ref 0–1.9)
BUN SERPL-MCNC: 14 MG/DL (ref 8–23)
CALCIUM SERPL-MCNC: 8.3 MG/DL (ref 8.7–10.5)
CHLORIDE SERPL-SCNC: 102 MMOL/L (ref 95–110)
CO2 SERPL-SCNC: 24 MMOL/L (ref 23–29)
CREAT SERPL-MCNC: 0.8 MG/DL (ref 0.5–1.4)
DIFFERENTIAL METHOD: ABNORMAL
EOSINOPHIL # BLD AUTO: 0 K/UL (ref 0–0.5)
EOSINOPHIL NFR BLD: 0.1 % (ref 0–8)
ERYTHROCYTE [DISTWIDTH] IN BLOOD BY AUTOMATED COUNT: 12.8 % (ref 11.5–14.5)
EST. GFR  (AFRICAN AMERICAN): >60 ML/MIN/1.73 M^2
EST. GFR  (NON AFRICAN AMERICAN): >60 ML/MIN/1.73 M^2
GLUCOSE SERPL-MCNC: 120 MG/DL (ref 70–110)
HCT VFR BLD AUTO: 27.9 % (ref 37–48.5)
HGB BLD-MCNC: 8.9 G/DL (ref 12–16)
IMM GRANULOCYTES # BLD AUTO: 0.21 K/UL (ref 0–0.04)
IMM GRANULOCYTES NFR BLD AUTO: 0.9 % (ref 0–0.5)
LYMPHOCYTES # BLD AUTO: 2.4 K/UL (ref 1–4.8)
LYMPHOCYTES NFR BLD: 10.8 % (ref 18–48)
MCH RBC QN AUTO: 29.5 PG (ref 27–31)
MCHC RBC AUTO-ENTMCNC: 31.9 G/DL (ref 32–36)
MCV RBC AUTO: 92 FL (ref 82–98)
MONOCYTES # BLD AUTO: 1.9 K/UL (ref 0.3–1)
MONOCYTES NFR BLD: 8.5 % (ref 4–15)
NEUTROPHILS # BLD AUTO: 17.6 K/UL (ref 1.8–7.7)
NEUTROPHILS NFR BLD: 79.6 % (ref 38–73)
NRBC BLD-RTO: 0 /100 WBC
PLATELET # BLD AUTO: 145 K/UL (ref 150–450)
PMV BLD AUTO: 12.4 FL (ref 9.2–12.9)
POCT GLUCOSE: 119 MG/DL (ref 70–110)
POCT GLUCOSE: 159 MG/DL (ref 70–110)
POTASSIUM SERPL-SCNC: 4 MMOL/L (ref 3.5–5.1)
RBC # BLD AUTO: 3.02 M/UL (ref 4–5.4)
SODIUM SERPL-SCNC: 135 MMOL/L (ref 136–145)
WBC # BLD AUTO: 22.16 K/UL (ref 3.9–12.7)

## 2021-11-13 PROCEDURE — 63600175 PHARM REV CODE 636 W HCPCS: Performed by: ORTHOPAEDIC SURGERY

## 2021-11-13 PROCEDURE — 94761 N-INVAS EAR/PLS OXIMETRY MLT: CPT

## 2021-11-13 PROCEDURE — 25000003 PHARM REV CODE 250: Performed by: PHYSICIAN ASSISTANT

## 2021-11-13 PROCEDURE — G0378 HOSPITAL OBSERVATION PER HR: HCPCS

## 2021-11-13 PROCEDURE — 97530 THERAPEUTIC ACTIVITIES: CPT

## 2021-11-13 PROCEDURE — 99233 SBSQ HOSP IP/OBS HIGH 50: CPT | Mod: ,,, | Performed by: PHYSICIAN ASSISTANT

## 2021-11-13 PROCEDURE — 11000001 HC ACUTE MED/SURG PRIVATE ROOM

## 2021-11-13 PROCEDURE — 25000003 PHARM REV CODE 250: Performed by: ORTHOPAEDIC SURGERY

## 2021-11-13 PROCEDURE — 63600175 PHARM REV CODE 636 W HCPCS: Performed by: PHYSICIAN ASSISTANT

## 2021-11-13 PROCEDURE — 97535 SELF CARE MNGMENT TRAINING: CPT

## 2021-11-13 PROCEDURE — 27000221 HC OXYGEN, UP TO 24 HOURS

## 2021-11-13 PROCEDURE — 99233 PR SUBSEQUENT HOSPITAL CARE,LEVL III: ICD-10-PCS | Mod: ,,, | Performed by: PHYSICIAN ASSISTANT

## 2021-11-13 PROCEDURE — 80048 BASIC METABOLIC PNL TOTAL CA: CPT | Performed by: PHYSICIAN ASSISTANT

## 2021-11-13 PROCEDURE — 36415 COLL VENOUS BLD VENIPUNCTURE: CPT | Performed by: PHYSICIAN ASSISTANT

## 2021-11-13 PROCEDURE — 94799 UNLISTED PULMONARY SVC/PX: CPT

## 2021-11-13 PROCEDURE — 85025 COMPLETE CBC W/AUTO DIFF WBC: CPT | Performed by: PHYSICIAN ASSISTANT

## 2021-11-13 RX ORDER — MORPHINE SULFATE 2 MG/ML
2 INJECTION, SOLUTION INTRAMUSCULAR; INTRAVENOUS EVERY 4 HOURS PRN
Status: DISCONTINUED | OUTPATIENT
Start: 2021-11-13 | End: 2021-11-14

## 2021-11-13 RX ORDER — SODIUM CHLORIDE 0.9 % (FLUSH) 0.9 %
5 SYRINGE (ML) INJECTION
Status: DISCONTINUED | OUTPATIENT
Start: 2021-11-13 | End: 2021-11-18 | Stop reason: HOSPADM

## 2021-11-13 RX ORDER — AMOXICILLIN 250 MG
1 CAPSULE ORAL 2 TIMES DAILY
Status: DISCONTINUED | OUTPATIENT
Start: 2021-11-13 | End: 2021-11-18 | Stop reason: HOSPADM

## 2021-11-13 RX ORDER — FAMOTIDINE 20 MG/1
20 TABLET, FILM COATED ORAL DAILY
Status: DISCONTINUED | OUTPATIENT
Start: 2021-11-13 | End: 2021-11-18 | Stop reason: HOSPADM

## 2021-11-13 RX ORDER — POLYETHYLENE GLYCOL 3350 17 G/17G
17 POWDER, FOR SOLUTION ORAL EVERY OTHER DAY
Status: DISCONTINUED | OUTPATIENT
Start: 2021-11-14 | End: 2021-11-13

## 2021-11-13 RX ORDER — HYDROCODONE BITARTRATE AND ACETAMINOPHEN 7.5; 325 MG/1; MG/1
1 TABLET ORAL EVERY 4 HOURS PRN
Status: DISCONTINUED | OUTPATIENT
Start: 2021-11-13 | End: 2021-11-18 | Stop reason: HOSPADM

## 2021-11-13 RX ORDER — ASPIRIN 81 MG/1
81 TABLET ORAL DAILY
Status: DISCONTINUED | OUTPATIENT
Start: 2021-11-13 | End: 2021-11-13

## 2021-11-13 RX ORDER — LANOLIN ALCOHOL/MO/W.PET/CERES
1 CREAM (GRAM) TOPICAL DAILY
Status: DISCONTINUED | OUTPATIENT
Start: 2021-11-13 | End: 2021-11-18 | Stop reason: HOSPADM

## 2021-11-13 RX ADMIN — ASPIRIN 81 MG CHEWABLE TABLET 81 MG: 81 TABLET CHEWABLE at 08:11

## 2021-11-13 RX ADMIN — POLYETHYLENE GLYCOL 3350 17 G: 17 POWDER, FOR SOLUTION ORAL at 09:11

## 2021-11-13 RX ADMIN — DOCUSATE SODIUM AND SENNOSIDES 1 TABLET: 8.6; 5 TABLET, FILM COATED ORAL at 08:11

## 2021-11-13 RX ADMIN — ASPIRIN 81 MG CHEWABLE TABLET 81 MG: 81 TABLET CHEWABLE at 09:11

## 2021-11-13 RX ADMIN — MORPHINE SULFATE 4 MG: 4 INJECTION INTRAVENOUS at 09:11

## 2021-11-13 RX ADMIN — LIDOCAINE 5% 1 PATCH: 700 PATCH TOPICAL at 05:11

## 2021-11-13 RX ADMIN — DOCUSATE SODIUM AND SENNOSIDES 1 TABLET: 8.6; 5 TABLET, FILM COATED ORAL at 01:11

## 2021-11-13 RX ADMIN — MORPHINE SULFATE 2 MG: 2 INJECTION, SOLUTION INTRAMUSCULAR; INTRAVENOUS at 06:11

## 2021-11-13 RX ADMIN — FAMOTIDINE 20 MG: 20 TABLET ORAL at 01:11

## 2021-11-13 RX ADMIN — FERROUS SULFATE TAB 325 MG (65 MG ELEMENTAL FE) 1 EACH: 325 (65 FE) TAB at 01:11

## 2021-11-13 NOTE — PT/OT/SLP PROGRESS
Physical Therapy Treatment    Patient Name:  Marylu Fenton   MRN:  [id number]    Recommendations:     Discharge Recommendations:  nursing facility, skilled   Discharge Equipment Recommendations: wheelchair,hospital bed   Barriers to discharge: Inaccessible home and Decreased caregiver support    Assessment:     Marylu Fenton is a 89 y.o. female admitted with a medical diagnosis of Closed left hip fracture.  She presents with the following impairments/functional limitations:  weakness,impaired endurance,impaired self care skills,impaired functional mobilty,gait instability,impaired balance,decreased lower extremity function,decreased safety awareness,pain,decreased ROM,orthopedic precautions.    Patient continues to require person assist for all mobility at this time. Patient required 2 attempts prior to successfully standing with OT/PT. Patient unable to recall any surgical precautions; therefore, reviewed all surgical precautions. Patient requires re-directioning to stay on task and verbal cues to complete task safely and correctly. Pt is at high risk of unplanned readmission due to fall risk and lack of 24 hour caregiver in prior setting.  Recommending SNF upon d/c.    Rehab Prognosis: Good; patient would benefit from acute skilled PT services to address these deficits and reach maximum level of function.    Recent Surgery: Procedure(s) (LRB):  HEMIARTHROPLASTY, HIP (Left) 2 Days Post-Op    Plan:     During this hospitalization, patient to be seen 5 x/week to address the identified rehab impairments via therapeutic activities,gait training,therapeutic exercises,neuromuscular re-education and progress toward the following goals:    · Plan of Care Expires:  12/12/21    Subjective     Chief Complaint: Pain  Patient/Family Comments/goals: Patient agreeable to participate in therapy  Pain/Comfort:  · Pain Rating 1:  (Pain not rated in beginning of session)  · Location - Side 1: Left  · Location - Orientation 1:  generalized  · Location 1: hip  · Pain Addressed 1: Reposition,Distraction  · Pain Rating Post-Intervention 1: 4/10      Objective:     Communicated with Ada Bergeron prior to session.  Patient found supine with bed alarm,peripheral IV,oxygen,SCD,PureWick upon PT entry to room. PT/OT notified RN of pureick not being on upon entrance to patient room, notified RN of lump on R inner thigh    General Precautions: Standard, fall   Orthopedic Precautions:LLE anterior precautions,LLE weight bearing as tolerated   Braces: N/A  Respiratory Status:  · Flow (L/min): 2  · O2 Device (Oxygen Therapy): nasal cannula     Functional Mobility:  · Bed Mobility:     · Scooting: total assistance and of 2 persons  · Patient unable to follow commands from PT/OT to scoot self in bed  · Positioned bed in trendelenburg; total A provided  · Supine to Sit: maximal assistance of 2 persons  · Verbal cues provided for hand placement  · HOB elevated  · Sit to Supine: total assistance and of 2 persons  · Verbal cues provided to complete task; difficulty following commands despite redirectioning  · Transfers  · Sit <> stand with RW, 4x -- total A x 2  · 2x unsuccessful; therefore, elevated bed to make task easier  · 2x total A x2   · Verbal and tactile cues for hand placement and foot placement during t/f  · Balance:  · Sitting EOB  · Static sitting -- Min A  · Dynamic balance -- Min A  OT helped patient doff soiled clothing/don new gown while PT provided balance support  Pt performed sitting therapeutic exercises for strengthening and to promote circulation and functional ROM with verbal and visual cues for correct performance:  Bilateral hip flexion x 5 reps: Max A provided at LLE; only able to lift ~ 25 degrees on each LE  Bilateral long arc quads x 5 reps   Bilateral ankle pumps x 10 reps   · Static standing with RW  · Total A x 2, 2x  · ~3-5 mins first attempt, verbal cues for posture; significant trunk flexion  · OT provided hygiene care while PT  supported patient in standing  · PT replaced karina pad while OT supported patient in standing  · 1-2 mins second attempt, verbal cues for posture; significant trunk flexion   · OT provided hygiene care while PT provided total A      AM-PAC 6 CLICK MOBILITY  Turning over in bed (including adjusting bedclothes, sheets and blankets)?: 2  Sitting down on and standing up from a chair with arms (e.g., wheelchair, bedside commode, etc.): 2  Moving from lying on back to sitting on the side of the bed?: 2  Moving to and from a bed to a chair (including a wheelchair)?: 1  Need to walk in hospital room?: 1  Climbing 3-5 steps with a railing?: 1  Basic Mobility Total Score: 9     Education:  · Reviewed surgical precautions  · Educated patient on POC  · Encouraged patient to use call button for any needs      Patient left supine with all lines intact, call button in reach, bed alarm on, RN notified.    GOALS:   Multidisciplinary Problems     Physical Therapy Goals        Problem: Physical Therapy Goal    Goal Priority Disciplines Outcome Goal Variances Interventions   Physical Therapy Goal     PT, PT/OT Ongoing, Progressing     Description: Goals to be met by: 12/12/21    Patient will perform the following to increase strength, improve mobility, and return to prior level of function:    1. Rolling to L and R with min A  2. Supine <> sit with min A.  3. Sit EOB x 15 min with SBA for balance.  4. Transfer sit <> stand with min A and LRAD.                    Time Tracking:     PT Received On: 11/13/21  PT Start Time: 1340     PT Stop Time: 1418  PT Total Time (min): 38 min   Co-treat with OT due to complex nature of patient and to optimize patient function and safety    Billable Minutes: Therapeutic Activity 25    Treatment Type: Treatment  PT/PTA: PT     PTA Visit Number: 0     11/13/2021

## 2021-11-13 NOTE — PT/OT/SLP PROGRESS
Occupational Therapy   Treatment    Name: Marylu Fenton  MRN: [id number]  Admitting Diagnosis:  Closed left hip fracture  2 Days Post-Op    Recommendations:     Discharge Recommendations: nursing facility, skilled  Discharge Equipment Recommendations:  wheelchair,hospital bed  Barriers to discharge:  Decreased caregiver support (current functinoal level)    Assessment:   Supine>sit with MAX A of 2 persons for BLE and trunk management along with cuing for safe sequencing; sit>supine with TOTAL A of 2 persons and cuing for sequencing task.  Sit>stand EOB>RW for 2 failed attempts with instruction on safe hand placement, and proper footing.  HOB elevated at this time.  Sit<>stand EOB<>RW X 2 trails with BUE hands at RW and increased cuing for forward trunk along with manual BLE positioning along with BLE foot blocking to maintain requiring TOTAL A of 2 persons.  Found with Purewick placed though suction off and noted to have completely saturated Purewick along with urine soiled pads.  Requiring TOTAL A for cleaning back molly/posterior thigh region during 1st successful stand and cleaning front molly region during 2nd; also assist needed for cleaning inner thigh while seated EOB.  Pt. Found to have food on night gown requiring MAX A for pullover gown removal though able unthread LUE sleeve; TOTAL A needed for donning hospital gown all while seated EOB; Pt. With MIN A for sitting EOB as Pt. with posterior trunk requiring increased cuing for flexion at knees and trunk to maintain safe sitting position and improve stability.  Fresh pads also placed while Pt. Was in standing and fresh Purewick placed at end of session with suction ON; RN made aware unmeasured urine output on padding and saturated Purewick.  Progressing towards goals.  Continued recommendation of post acute OT in SNF.  To benefit from continued acute care OT services to increase independence in self-care/functional transfers.  Continue POC.     Marylu B  Jossy is a 89 y.o. female with a medical diagnosis of Closed left hip fracture.  She presents with below deficits decreasing independence in self-care/functional transfers. Performance deficits affecting function are weakness,impaired endurance,impaired functional mobilty,impaired self care skills,gait instability,decreased lower extremity function,decreased upper extremity function,orthopedic precautions,decreased ROM,decreased safety awareness,pain,impaired skin.     Rehab Prognosis:  Good; patient would benefit from acute skilled OT services to address these deficits and reach maximum level of function.       Plan:     Patient to be seen 5 x/week to address the above listed problems via self-care/home management,therapeutic activities,therapeutic exercises  · Plan of Care Expires: 12/12/21  · Plan of Care Reviewed with: patient    Subjective     Pain/Comfort:  Pain Rating 1:  (Not rated.)  Location - Side 1: Left  Location - Orientation 1: generalized  Location 1: hip  Pain Addressed 1: Reposition,Distraction,Nurse notified,Cessation of Activity  Pain Rating Post-Intervention 1: 4/10 (at rest after activity)    Objective:     Communicated with: Elyssa SAMANO RN prior to session.  Patient found HOB elevated with bed alarm,peripheral IV,PureWick,oxygen,SCD,hip abduction pillow upon OT entry to room.    General Precautions: Standard, fall   Orthopedic Precautions:LLE weight bearing as tolerated,LLE anterior precautions (no ER/ext/ADD)   Braces: N/A  Respiratory Status:  Flow (L/min): 2  O2 Device (Oxygen Therapy): nasal cannula     Occupational Performance:     Bed Mobility:    Supine>sit with MAX A of 2 persons for BLE and trunk management along with cuing for safe sequencing; sit>supine with TOTAL A of 2 persons and cuing for sequencing task.     Functional Mobility/Transfers:  Sit>stand EOB>RW for 2 failed attempts with instruction on safe hand placement, and proper footing.  HOB elevated at this time.  Sit<>stand  EOB<>RW X 2 trails with BUE hands at RW and increased cuing for forward trunk along with manual BLE positioning along with BLE foot blocking to maintain requiring TOTAL A of 2 persons.    Activities of Daily Living:  Found with Purewick placed though suction off and noted to have completely saturated Purewick along with urine soiled pads.  Requiring TOTAL A for cleaning back molly/posterior thigh region during 1st successful stand and cleaning front molly region during 2nd; also assist needed for cleaning inner thigh while seated EOB.  Pt. Found to have food on night gown requiring MAX A for pullover gown removal though able unthread LUE sleeve; TOTAL A needed for donning hospital gown all while seated EOB; Pt. With MIN A for sitting EOB as Pt. with posterior trunk requiring increased cuing for flexion at knees and trunk to maintain safe sitting position and improve stability.      [facility] 6 Click ADL: 9    Treatment & Education:  Educated on anterior hip precautions, review of call light, importance of calling for assist, and safety with bed mobility/transitional movements/ADL this day.    Patient left HOB elevated with all lines intact, call button in reach, bed alarm on and nursing notifiedEducation:      GOALS:   Multidisciplinary Problems     Occupational Therapy Goals        Problem: Occupational Therapy Goal    Goal Priority Disciplines Outcome Interventions   Occupational Therapy Goal     OT, PT/OT Ongoing, Progressing    Description: Goals to be met by: 11/19/2021     Patient will increase functional independence with ADLs by performing:    UE Dressing with Minimal Assistance.  LE Dressing with Moderate Assistance and Assistive Devices as needed.  Grooming while EOB with Supervision.  Sitting at edge of bed x15 minutes with Supervision without use of Ues   Toilet transfer to bedside commode with Moderate Assistance.                     Time Tracking:     OT Date of Treatment: 11/13/21  OT Start Time: 1341  OT  Stop Time: 1418  OT Total Time (min): 37 min (overlap with PT)    Billable Minutes:Self Care/Home Management 23    OT/OUSMANE: OT          11/13/2021

## 2021-11-13 NOTE — PLAN OF CARE
Problem: Adult Inpatient Plan of Care  Goal: Plan of Care Review  Outcome: Ongoing, Progressing  Goal: Patient-Specific Goal (Individualization)  Outcome: Ongoing, Progressing  Goal: Absence of Hospital-Acquired Illness or Injury  Outcome: Ongoing, Progressing  Goal: Optimal Comfort and Wellbeing  Outcome: Ongoing, Progressing  Goal: Readiness for Transition of Care  Outcome: Ongoing, Progressing  Goal: Rounds/Family Conference  Outcome: Ongoing, Progressing     Problem: Diabetes Comorbidity  Goal: Blood Glucose Level Within Desired Range  Outcome: Ongoing, Progressing     Problem: Skin Injury Risk Increased  Goal: Skin Health and Integrity  Outcome: Ongoing, Progressing     Problem: Pain Acute  Goal: Optimal Pain Control  Outcome: Ongoing, Progressing     Problem: Fall Injury Risk  Goal: Absence of Fall and Fall-Related Injury  Outcome: Ongoing, Progressing     Problem: Infection  Goal: Infection Symptom Resolution  Outcome: Ongoing, Progressing

## 2021-11-13 NOTE — PROGRESS NOTES
[facility] Hospital Medicine  Progress Note    Patient Name: Marylu Fenton  MRN: [id number]  Patient Class: IP- Inpatient   Admission Date: 11/10/2021  Length of Stay: 2 days  Attending Physician: Radha Azul MD  Primary Care Provider: Kathy Steele MD        Subjective:     Principal Problem:Closed left hip fracture        HPI:  Ms. Marylu Fenton is a 89 y.o. female, with PMH of ventral hernia, prior SBO, and OA of the knee, FERD, T2DM, HTN, who was transferring between walker and the toilet today when she had two falls. Her familt was unable to lift her after the first fall and the Fire Department was called, but she fell again after they lifted her into her wheelchair as she tried to get up to use the toilet. After the second fall, she was unable to stand and bear weight and thus EMS was called. Upon EMS arrival her LLE was shortened and rotated upon EMS arrival. She also noted right sided neck pain. She denied head injury or LOC during the falls, and denied light headedness. In the ED, imaging showed a left femoral neck fracture. Per Ortho she is to [facility] N[facility] fixation Thursday or Friday. She is placed on OBS.       Overview/Hospital Course:  89-year-old female admitted status post fall at home with displaced left femoral neck fracture.  Orthopedics was consulted patient underwent left hip hemiarthroplasty.      Interval History:  In bed eating breakfast, reports decreased pain.  States that she does not feel like her  can take care of her at home interested in rehab    Review of Systems   Constitutional: Negative for chills and fever.   HENT: Negative for congestion.    Eyes: Negative.    Respiratory: Negative for cough.    Cardiovascular: Negative for chest pain.   Gastrointestinal: Negative for abdominal distention, abdominal pain, diarrhea, nausea and vomiting.   Genitourinary: Negative for difficulty urinating and dysuria.   Musculoskeletal:  Positive for arthralgias.   Skin: Negative.    Neurological: Negative for dizziness, light-headedness and headaches.     Objective:     Vital Signs (Most Recent):  Temp: 97.8 °F (36.6 °C) (11/13/21 1147)  Pulse: (!) 121 (11/13/21 1147)  Resp: 20 (11/13/21 1147)  BP: (!) 101/55 (11/13/21 1147)  SpO2: 100 % (11/13/21 0803) Vital Signs (24h Range):  Temp:  [97.5 °F (36.4 °C)-98.8 °F (37.1 °C)] 97.8 °F (36.6 °C)  Pulse:  [] 121  Resp:  [17-20] 20  SpO2:  [94 %-100 %] 100 %  BP: (101-150)/(55-80) 101/55     Weight: 72.1 kg (159 lb)  Body mass index is 24.9 kg/m².    Intake/Output Summary (Last 24 hours) at 11/13/2021 1401  Last data filed at 11/13/2021 0600  Gross per 24 hour   Intake 1933.62 ml   Output 754 ml   Net 1179.62 ml      Physical Exam  Vitals and nursing note reviewed.   Constitutional:       General: She is not in acute distress.     Appearance: Normal appearance. She is well-developed. She is not ill-appearing or diaphoretic.   HENT:      Head: Normocephalic and atraumatic.   Eyes:      General: No scleral icterus.     Conjunctiva/sclera: Conjunctivae normal.   Neck:      Trachea: No tracheal deviation.   Cardiovascular:      Rate and Rhythm: Normal rate and regular rhythm.      Heart sounds: Normal heart sounds.   Pulmonary:      Effort: Pulmonary effort is normal. No respiratory distress.      Breath sounds: Normal breath sounds. No wheezing.   Abdominal:      General: Bowel sounds are normal. There is no distension.      Palpations: Abdomen is soft.      Tenderness: There is no abdominal tenderness.   Musculoskeletal:      Cervical back: Normal range of motion and neck supple.      Comments: With left hip dressing in place, C/D/I  NVI   Skin:     General: Skin is warm and dry.   Neurological:      Mental Status: She is alert and oriented to person, place, and time.   Psychiatric:         Mood and Affect: Mood normal.         Significant Labs:   All pertinent labs within the past 24 hours have been  reviewed.  CBC:   Recent Labs   Lab 11/12/21 0436 11/13/21 0458   WBC 17.20* 22.16*   HGB 9.4* 8.9*   HCT 29.3* 27.9*   * 145*     CMP:   Recent Labs   Lab 11/12/21 0436 11/13/21 0458    135*   K 4.2 4.0    102   CO2 22* 24   * 120*   BUN 13 14   CREATININE 0.8 0.8   CALCIUM 8.5* 8.3*   ANIONGAP 10 9   EGFRNONAA >60 >60       Significant Imaging: I have reviewed all pertinent imaging results/findings within the past 24 hours.      Assessment/Plan:      * Closed left hip fracture  - Ms. Marylu Fenton presents after 2 witnessed falls at home with left femoral neck fracture  - POD #2 s/p left hip hemiarthroplasty  - pain management, DVT prophylaxis ASA 81 mg BID  - PT/OT  - orthopedic recs noted  -  consulted for SNF    Hyperlipidemia  - continue simvastatin 10 mg QD           Essential hypertension  - normotensive at present   - patient states she no longer takes any blood pressure meds   - monitor       DM II (diabetes mellitus, type II), controlled  - last A1C: 5.4  - states she is not a diabetic not on medications        VTE Risk Mitigation (From admission, onward)         Ordered     IP VTE LOW RISK PATIENT  Once         11/13/21 1008     Place sequential compression device  Until discontinued         11/13/21 1008     Place sequential compression device  Until discontinued         11/10/21 2047                Discharge Planning   NOE:      Code Status: Full Code   Is the patient medically ready for discharge?:     Reason for patient still in hospital (select all that apply): Patient trending condition, Consult recommendations and PT / OT recommendations                     Joyce Boudreaux PA-C  Department of Hospital Medicine   [facility]

## 2021-11-13 NOTE — ASSESSMENT & PLAN NOTE
- Ms. Nunna WARREN Fenton presents after 2 witnessed falls at home with left femoral neck fracture  - POD #2 s/p left hip hemiarthroplasty  - pain management, DVT prophylaxis ASA 81 mg BID  - PT/OT  - orthopedic recs noted  -  consulted for SNF

## 2021-11-13 NOTE — PLAN OF CARE
Problem: Physical Therapy Goal  Goal: Physical Therapy Goal  Description: Goals to be met by: 12/12/21    Patient will perform the following to increase strength, improve mobility, and return to prior level of function:    1. Rolling to L and R with min A  2. Supine <> sit with min A.  3. Sit EOB x 15 min with SBA for balance.  4. Transfer sit <> stand with min A and LRAD.   Outcome: Ongoing, Progressing       Patient continues to require person assist for all mobility at this time. Patient required 2 attempts prior to successfully standing with OT/PT. Patient unable to recall any surgical precautions; therefore, reviewed all surgical precautions. Pt is at high risk of unplanned readmission due to fall risk and lack of 24 hour caregiver in prior setting.  Recommending SNF upon d/c.

## 2021-11-13 NOTE — SUBJECTIVE & OBJECTIVE
Interval History:  In bed eating breakfast, reports decreased pain.  States that she does not feel like her  can take care of her at home interested in rehab    Review of Systems   Constitutional: Negative for chills and fever.   HENT: Negative for congestion.    Eyes: Negative.    Respiratory: Negative for cough.    Cardiovascular: Negative for chest pain.   Gastrointestinal: Negative for abdominal distention, abdominal pain, diarrhea, nausea and vomiting.   Genitourinary: Negative for difficulty urinating and dysuria.   Musculoskeletal: Positive for arthralgias.   Skin: Negative.    Neurological: Negative for dizziness, light-headedness and headaches.     Objective:     Vital Signs (Most Recent):  Temp: 97.8 °F (36.6 °C) (11/13/21 1147)  Pulse: (!) 121 (11/13/21 1147)  Resp: 20 (11/13/21 1147)  BP: (!) 101/55 (11/13/21 1147)  SpO2: 100 % (11/13/21 0803) Vital Signs (24h Range):  Temp:  [97.5 °F (36.4 °C)-98.8 °F (37.1 °C)] 97.8 °F (36.6 °C)  Pulse:  [] 121  Resp:  [17-20] 20  SpO2:  [94 %-100 %] 100 %  BP: (101-150)/(55-80) 101/55     Weight: 72.1 kg (159 lb)  Body mass index is 24.9 kg/m².    Intake/Output Summary (Last 24 hours) at 11/13/2021 1401  Last data filed at 11/13/2021 0600  Gross per 24 hour   Intake 1933.62 ml   Output 754 ml   Net 1179.62 ml      Physical Exam  Vitals and nursing note reviewed.   Constitutional:       General: She is not in acute distress.     Appearance: Normal appearance. She is well-developed. She is not ill-appearing or diaphoretic.   HENT:      Head: Normocephalic and atraumatic.   Eyes:      General: No scleral icterus.     Conjunctiva/sclera: Conjunctivae normal.   Neck:      Trachea: No tracheal deviation.   Cardiovascular:      Rate and Rhythm: Normal rate and regular rhythm.      Heart sounds: Normal heart sounds.   Pulmonary:      Effort: Pulmonary effort is normal. No respiratory distress.      Breath sounds: Normal breath sounds. No wheezing.   Abdominal:       General: Bowel sounds are normal. There is no distension.      Palpations: Abdomen is soft.      Tenderness: There is no abdominal tenderness.   Musculoskeletal:      Cervical back: Normal range of motion and neck supple.      Comments: With left hip dressing in place, C/D/I  NVI   Skin:     General: Skin is warm and dry.   Neurological:      Mental Status: She is alert and oriented to person, place, and time.   Psychiatric:         Mood and Affect: Mood normal.         Significant Labs:   All pertinent labs within the past 24 hours have been reviewed.  CBC:   Recent Labs   Lab 11/12/21 0436 11/13/21 0458   WBC 17.20* 22.16*   HGB 9.4* 8.9*   HCT 29.3* 27.9*   * 145*     CMP:   Recent Labs   Lab 11/12/21 0436 11/13/21 0458    135*   K 4.2 4.0    102   CO2 22* 24   * 120*   BUN 13 14   CREATININE 0.8 0.8   CALCIUM 8.5* 8.3*   ANIONGAP 10 9   EGFRNONAA >60 >60       Significant Imaging: I have reviewed all pertinent imaging results/findings within the past 24 hours.

## 2021-11-13 NOTE — PROGRESS NOTES
Afebrtile, vital signs stable  L LE NVI  Calves NT   Inc C/D/I  S/p L hip hemiarthroplasty  1. Pt  2. DVT proph  3. Pain control

## 2021-11-14 LAB
ANION GAP SERPL CALC-SCNC: 11 MMOL/L (ref 8–16)
BASOPHILS # BLD AUTO: 0.05 K/UL (ref 0–0.2)
BASOPHILS NFR BLD: 0.3 % (ref 0–1.9)
BUN SERPL-MCNC: 28 MG/DL (ref 8–23)
CALCIUM SERPL-MCNC: 8.4 MG/DL (ref 8.7–10.5)
CHLORIDE SERPL-SCNC: 103 MMOL/L (ref 95–110)
CO2 SERPL-SCNC: 21 MMOL/L (ref 23–29)
CREAT SERPL-MCNC: 0.8 MG/DL (ref 0.5–1.4)
DIFFERENTIAL METHOD: ABNORMAL
EOSINOPHIL # BLD AUTO: 0.1 K/UL (ref 0–0.5)
EOSINOPHIL NFR BLD: 0.7 % (ref 0–8)
ERYTHROCYTE [DISTWIDTH] IN BLOOD BY AUTOMATED COUNT: 12.9 % (ref 11.5–14.5)
EST. GFR  (AFRICAN AMERICAN): >60 ML/MIN/1.73 M^2
EST. GFR  (NON AFRICAN AMERICAN): >60 ML/MIN/1.73 M^2
GLUCOSE SERPL-MCNC: 148 MG/DL (ref 70–110)
HCT VFR BLD AUTO: 26.3 % (ref 37–48.5)
HGB BLD-MCNC: 8.4 G/DL (ref 12–16)
IMM GRANULOCYTES # BLD AUTO: 0.15 K/UL (ref 0–0.04)
IMM GRANULOCYTES NFR BLD AUTO: 0.8 % (ref 0–0.5)
LYMPHOCYTES # BLD AUTO: 2.5 K/UL (ref 1–4.8)
LYMPHOCYTES NFR BLD: 12.7 % (ref 18–48)
MCH RBC QN AUTO: 29.6 PG (ref 27–31)
MCHC RBC AUTO-ENTMCNC: 31.9 G/DL (ref 32–36)
MCV RBC AUTO: 93 FL (ref 82–98)
MONOCYTES # BLD AUTO: 2 K/UL (ref 0.3–1)
MONOCYTES NFR BLD: 10 % (ref 4–15)
NEUTROPHILS # BLD AUTO: 14.8 K/UL (ref 1.8–7.7)
NEUTROPHILS NFR BLD: 75.5 % (ref 38–73)
NRBC BLD-RTO: 0 /100 WBC
PLATELET # BLD AUTO: 157 K/UL (ref 150–450)
PMV BLD AUTO: 12.7 FL (ref 9.2–12.9)
POCT GLUCOSE: 167 MG/DL (ref 70–110)
POCT GLUCOSE: 171 MG/DL (ref 70–110)
POCT GLUCOSE: 205 MG/DL (ref 70–110)
POCT GLUCOSE: 220 MG/DL (ref 70–110)
POTASSIUM SERPL-SCNC: 4 MMOL/L (ref 3.5–5.1)
RBC # BLD AUTO: 2.84 M/UL (ref 4–5.4)
SODIUM SERPL-SCNC: 135 MMOL/L (ref 136–145)
WBC # BLD AUTO: 19.61 K/UL (ref 3.9–12.7)

## 2021-11-14 PROCEDURE — G0378 HOSPITAL OBSERVATION PER HR: HCPCS

## 2021-11-14 PROCEDURE — 99233 SBSQ HOSP IP/OBS HIGH 50: CPT | Mod: ,,, | Performed by: PHYSICIAN ASSISTANT

## 2021-11-14 PROCEDURE — 94761 N-INVAS EAR/PLS OXIMETRY MLT: CPT

## 2021-11-14 PROCEDURE — 25000003 PHARM REV CODE 250: Performed by: PHYSICIAN ASSISTANT

## 2021-11-14 PROCEDURE — 36415 COLL VENOUS BLD VENIPUNCTURE: CPT | Performed by: ORTHOPAEDIC SURGERY

## 2021-11-14 PROCEDURE — 96372 THER/PROPH/DIAG INJ SC/IM: CPT

## 2021-11-14 PROCEDURE — 85025 COMPLETE CBC W/AUTO DIFF WBC: CPT | Performed by: ORTHOPAEDIC SURGERY

## 2021-11-14 PROCEDURE — 99233 PR SUBSEQUENT HOSPITAL CARE,LEVL III: ICD-10-PCS | Mod: ,,, | Performed by: PHYSICIAN ASSISTANT

## 2021-11-14 PROCEDURE — 80048 BASIC METABOLIC PNL TOTAL CA: CPT | Performed by: ORTHOPAEDIC SURGERY

## 2021-11-14 PROCEDURE — 11000001 HC ACUTE MED/SURG PRIVATE ROOM

## 2021-11-14 PROCEDURE — 25000003 PHARM REV CODE 250: Performed by: ORTHOPAEDIC SURGERY

## 2021-11-14 PROCEDURE — 63600175 PHARM REV CODE 636 W HCPCS: Performed by: ORTHOPAEDIC SURGERY

## 2021-11-14 RX ORDER — MORPHINE SULFATE 2 MG/ML
2 INJECTION, SOLUTION INTRAMUSCULAR; INTRAVENOUS EVERY 6 HOURS PRN
Status: DISCONTINUED | OUTPATIENT
Start: 2021-11-14 | End: 2021-11-18 | Stop reason: HOSPADM

## 2021-11-14 RX ORDER — ONDANSETRON 2 MG/ML
4 INJECTION INTRAMUSCULAR; INTRAVENOUS EVERY 6 HOURS PRN
Status: DISCONTINUED | OUTPATIENT
Start: 2021-11-14 | End: 2021-11-18 | Stop reason: HOSPADM

## 2021-11-14 RX ADMIN — MORPHINE SULFATE 2 MG: 2 INJECTION, SOLUTION INTRAMUSCULAR; INTRAVENOUS at 08:11

## 2021-11-14 RX ADMIN — DOCUSATE SODIUM AND SENNOSIDES 1 TABLET: 8.6; 5 TABLET, FILM COATED ORAL at 08:11

## 2021-11-14 RX ADMIN — ASPIRIN 81 MG CHEWABLE TABLET 81 MG: 81 TABLET CHEWABLE at 08:11

## 2021-11-14 RX ADMIN — POLYETHYLENE GLYCOL 3350 17 G: 17 POWDER, FOR SOLUTION ORAL at 08:11

## 2021-11-14 RX ADMIN — FERROUS SULFATE TAB 325 MG (65 MG ELEMENTAL FE) 1 EACH: 325 (65 FE) TAB at 08:11

## 2021-11-14 RX ADMIN — LIDOCAINE 5% 1 PATCH: 700 PATCH TOPICAL at 04:11

## 2021-11-14 RX ADMIN — INSULIN ASPART 1 UNITS: 100 INJECTION, SOLUTION INTRAVENOUS; SUBCUTANEOUS at 09:11

## 2021-11-14 RX ADMIN — FAMOTIDINE 20 MG: 20 TABLET ORAL at 08:11

## 2021-11-14 RX ADMIN — HYDROCODONE BITARTRATE AND ACETAMINOPHEN 1 TABLET: 7.5; 325 TABLET ORAL at 08:11

## 2021-11-14 NOTE — PROGRESS NOTES
[facility] Hospital Medicine  Progress Note    Patient Name: Marylu Fenton  MRN: [id number]  Patient Class: IP- Inpatient   Admission Date: 11/10/2021  Length of Stay: 3 days  Attending Physician: Radha Azul MD  Primary Care Provider: Kathy Steele MD        Subjective:     Principal Problem:Closed left hip fracture        HPI:  Ms. Marylu Fenton is a 89 y.o. female, with PMH of ventral hernia, prior SBO, and OA of the knee, FERD, T2DM, HTN, who was transferring between walker and the toilet today when she had two falls. Her familt was unable to lift her after the first fall and the Fire Department was called, but she fell again after they lifted her into her wheelchair as she tried to get up to use the toilet. After the second fall, she was unable to stand and bear weight and thus EMS was called. Upon EMS arrival her LLE was shortened and rotated upon EMS arrival. She also noted right sided neck pain. She denied head injury or LOC during the falls, and denied light headedness. In the ED, imaging showed a left femoral neck fracture. Per Ortho she is to be NPO [facility] for [facility] fixation Thursday or Friday. She is placed on OBS.       Overview/Hospital Course:  89-year-old female admitted status post fall at home with displaced left femoral neck fracture.  Orthopedics was consulted patient underwent left hip hemiarthroplasty.      Interval History:  In bed eating breakfast, reports decreased pain.        Review of Systems   Constitutional: Negative for chills and fever.   HENT: Negative for congestion.    Eyes: Negative.    Respiratory: Negative for cough.    Cardiovascular: Negative for chest pain.   Gastrointestinal: Negative for abdominal distention, abdominal pain, diarrhea, nausea and vomiting.   Genitourinary: Negative for difficulty urinating and dysuria.   Musculoskeletal: Positive for arthralgias.   Skin: Negative.    Neurological: Negative for dizziness,  light-headedness and headaches.     Objective:     Vital Signs (Most Recent):  Temp: 97.4 °F (36.3 °C) (11/14/21 1128)  Pulse: 101 (11/14/21 1128)  Resp: 20 (11/14/21 1128)  BP: (!) 112/58 (11/14/21 1128)  SpO2: 98 % (11/14/21 1108) Vital Signs (24h Range):  Temp:  [97 °F (36.1 °C)-98.9 °F (37.2 °C)] 97.4 °F (36.3 °C)  Pulse:  [] 101  Resp:  [16-20] 20  SpO2:  [95 %-100 %] 98 %  BP: ()/(57-64) 112/58     Weight: 72.1 kg (159 lb)  Body mass index is 24.9 kg/m².  No intake or output data in the 24 hours ending 11/14/21 1400   Physical Exam  Vitals and nursing note reviewed.   Constitutional:       General: She is not in acute distress.     Appearance: Normal appearance. She is well-developed. She is not ill-appearing or diaphoretic.   HENT:      Head: Normocephalic and atraumatic.   Eyes:      General: No scleral icterus.     Conjunctiva/sclera: Conjunctivae normal.   Neck:      Trachea: No tracheal deviation.   Cardiovascular:      Rate and Rhythm: Normal rate and regular rhythm.      Heart sounds: Normal heart sounds.   Pulmonary:      Effort: Pulmonary effort is normal. No respiratory distress.      Breath sounds: Normal breath sounds. No wheezing.   Abdominal:      General: Bowel sounds are normal. There is no distension.      Palpations: Abdomen is soft.      Tenderness: There is no abdominal tenderness.   Musculoskeletal:      Cervical back: Normal range of motion and neck supple.      Comments: With left hip dressing in place, C/D/I  NVI   Skin:     General: Skin is warm and dry.   Neurological:      Mental Status: She is alert and oriented to person, place, and time.   Psychiatric:         Mood and Affect: Mood normal.         Significant Labs:   All pertinent labs within the past 24 hours have been reviewed.  CBC:   Recent Labs   Lab 11/13/21  0458 11/14/21  0521   WBC 22.16* 19.61*   HGB 8.9* 8.4*   HCT 27.9* 26.3*   * 157     CMP:   Recent Labs   Lab 11/13/21  0458 11/14/21  0521   NA  135* 135*   K 4.0 4.0    103   CO2 24 21*   * 148*   BUN 14 28*   CREATININE 0.8 0.8   CALCIUM 8.3* 8.4*   ANIONGAP 9 11   EGFRNONAA >60 >60       Significant Imaging: I have reviewed all pertinent imaging results/findings within the past 24 hours.      Assessment/Plan:      * Closed left hip fracture  - Ms. Marylu Fenton presents after 2 witnessed falls at home with left femoral neck fracture  - POD #3 s/p left hip hemiarthroplasty  - pain management, DVT prophylaxis ASA 81 mg BID  - PT/OT  - orthopedic recs noted  -  consulted for SNF  - discussed plan of care with patient,  and daughter who agree with plan    Hyperlipidemia  - continue simvastatin 10 mg QD           Essential hypertension  - normotensive at present   - patient states she no longer takes any blood pressure meds   - monitor       DM II (diabetes mellitus, type II), controlled  - last A1C: 5.4  - states she is not a diabetic not on medications        VTE Risk Mitigation (From admission, onward)         Ordered     IP VTE LOW RISK PATIENT  Once         11/13/21 1008     Place sequential compression device  Until discontinued         11/13/21 1008     Place sequential compression device  Until discontinued         11/10/21 2047                Discharge Planning   NOE:      Code Status: Full Code   Is the patient medically ready for discharge?:     Reason for patient still in hospital (select all that apply): Patient trending condition, Consult recommendations and PT / OT recommendations                     Joyce Boudreaux PA-C  Department of Hospital Medicine   [facility]

## 2021-11-14 NOTE — PLAN OF CARE
Patient worked with PT and OT today.  Medicated twice during the shift for pain.  VSS.  Currently resting quietly in bed visiting with family.   Problem: Adult Inpatient Plan of Care  Goal: Plan of Care Review  Outcome: Ongoing, Progressing     Problem: Pain Acute  Goal: Optimal Pain Control  Outcome: Ongoing, Progressing

## 2021-11-14 NOTE — PROGRESS NOTES
Afebrile VSS  No complaints  L LE NVI, calves NT  Thigh soft, dressing intact  S.p L hip HEmiarthroplasty  1. PT  2. DVT proph  3. Pain control

## 2021-11-14 NOTE — ASSESSMENT & PLAN NOTE
- MsChina Marylu Fenton presents after 2 witnessed falls at home with left femoral neck fracture  - POD #3 s/p left hip hemiarthroplasty  - pain management, DVT prophylaxis ASA 81 mg BID  - PT/OT  - orthopedic recs noted  -  consulted for SNF  - discussed plan of care with patient,  and daughter who agree with plan

## 2021-11-14 NOTE — NURSING
VSS and afebrile. Dressing is CDI. Pt doing well. Purewick in place. Ordered diet tolerated well. Plan of care reviewed with patient. Purposeful rounding done, call light at bed side, bed at lowest position, brakes on, non-skid socks on, will continue to monitor.

## 2021-11-14 NOTE — SUBJECTIVE & OBJECTIVE
Interval History:  In bed eating breakfast, reports decreased pain.        Review of Systems   Constitutional: Negative for chills and fever.   HENT: Negative for congestion.    Eyes: Negative.    Respiratory: Negative for cough.    Cardiovascular: Negative for chest pain.   Gastrointestinal: Negative for abdominal distention, abdominal pain, diarrhea, nausea and vomiting.   Genitourinary: Negative for difficulty urinating and dysuria.   Musculoskeletal: Positive for arthralgias.   Skin: Negative.    Neurological: Negative for dizziness, light-headedness and headaches.     Objective:     Vital Signs (Most Recent):  Temp: 97.4 °F (36.3 °C) (11/14/21 1128)  Pulse: 101 (11/14/21 1128)  Resp: 20 (11/14/21 1128)  BP: (!) 112/58 (11/14/21 1128)  SpO2: 98 % (11/14/21 1108) Vital Signs (24h Range):  Temp:  [97 °F (36.1 °C)-98.9 °F (37.2 °C)] 97.4 °F (36.3 °C)  Pulse:  [] 101  Resp:  [16-20] 20  SpO2:  [95 %-100 %] 98 %  BP: ()/(57-64) 112/58     Weight: 72.1 kg (159 lb)  Body mass index is 24.9 kg/m².  No intake or output data in the 24 hours ending 11/14/21 1400   Physical Exam  Vitals and nursing note reviewed.   Constitutional:       General: She is not in acute distress.     Appearance: Normal appearance. She is well-developed. She is not ill-appearing or diaphoretic.   HENT:      Head: Normocephalic and atraumatic.   Eyes:      General: No scleral icterus.     Conjunctiva/sclera: Conjunctivae normal.   Neck:      Trachea: No tracheal deviation.   Cardiovascular:      Rate and Rhythm: Normal rate and regular rhythm.      Heart sounds: Normal heart sounds.   Pulmonary:      Effort: Pulmonary effort is normal. No respiratory distress.      Breath sounds: Normal breath sounds. No wheezing.   Abdominal:      General: Bowel sounds are normal. There is no distension.      Palpations: Abdomen is soft.      Tenderness: There is no abdominal tenderness.   Musculoskeletal:      Cervical back: Normal range of motion  and neck supple.      Comments: With left hip dressing in place, C/D/I  NVI   Skin:     General: Skin is warm and dry.   Neurological:      Mental Status: She is alert and oriented to person, place, and time.   Psychiatric:         Mood and Affect: Mood normal.         Significant Labs:   All pertinent labs within the past 24 hours have been reviewed.  CBC:   Recent Labs   Lab 11/13/21 0458 11/14/21  0521   WBC 22.16* 19.61*   HGB 8.9* 8.4*   HCT 27.9* 26.3*   * 157     CMP:   Recent Labs   Lab 11/13/21 0458 11/14/21  0521   * 135*   K 4.0 4.0    103   CO2 24 21*   * 148*   BUN 14 28*   CREATININE 0.8 0.8   CALCIUM 8.3* 8.4*   ANIONGAP 9 11   EGFRNONAA >60 >60       Significant Imaging: I have reviewed all pertinent imaging results/findings within the past 24 hours.

## 2021-11-14 NOTE — PLAN OF CARE
POC reviewed with patient and family.  Daughters are in agreement with moving forward with SNF placement.  Patient's pain managed effectively with IV pain meds. Encouraged to take po meds which she is in agreement with .  Low grade time noted at 99.8. Encouraged IS, reeducated on use.  Verbalizes understanding.  Large BM today.  Purposeful rounding.  NAD noted.  Remains free from falls and injury.  Will continue monitoring.  Problem: Pain Acute  Goal: Optimal Pain Control  Outcome: Ongoing, Progressing

## 2021-11-15 LAB
ANION GAP SERPL CALC-SCNC: 9 MMOL/L (ref 8–16)
BASOPHILS # BLD AUTO: 0.03 K/UL (ref 0–0.2)
BASOPHILS NFR BLD: 0.2 % (ref 0–1.9)
BUN SERPL-MCNC: 28 MG/DL (ref 8–23)
CALCIUM SERPL-MCNC: 8.4 MG/DL (ref 8.7–10.5)
CHLORIDE SERPL-SCNC: 105 MMOL/L (ref 95–110)
CO2 SERPL-SCNC: 23 MMOL/L (ref 23–29)
CREAT SERPL-MCNC: 0.8 MG/DL (ref 0.5–1.4)
DIFFERENTIAL METHOD: ABNORMAL
EOSINOPHIL # BLD AUTO: 0.3 K/UL (ref 0–0.5)
EOSINOPHIL NFR BLD: 2 % (ref 0–8)
ERYTHROCYTE [DISTWIDTH] IN BLOOD BY AUTOMATED COUNT: 13.1 % (ref 11.5–14.5)
EST. GFR  (AFRICAN AMERICAN): >60 ML/MIN/1.73 M^2
EST. GFR  (NON AFRICAN AMERICAN): >60 ML/MIN/1.73 M^2
GLUCOSE SERPL-MCNC: 128 MG/DL (ref 70–110)
HCT VFR BLD AUTO: 25.7 % (ref 37–48.5)
HGB BLD-MCNC: 8.3 G/DL (ref 12–16)
IMM GRANULOCYTES # BLD AUTO: 0.17 K/UL (ref 0–0.04)
IMM GRANULOCYTES NFR BLD AUTO: 1.2 % (ref 0–0.5)
LYMPHOCYTES # BLD AUTO: 2.2 K/UL (ref 1–4.8)
LYMPHOCYTES NFR BLD: 15.1 % (ref 18–48)
MCH RBC QN AUTO: 29.6 PG (ref 27–31)
MCHC RBC AUTO-ENTMCNC: 32.3 G/DL (ref 32–36)
MCV RBC AUTO: 92 FL (ref 82–98)
MONOCYTES # BLD AUTO: 1.9 K/UL (ref 0.3–1)
MONOCYTES NFR BLD: 13.3 % (ref 4–15)
NEUTROPHILS # BLD AUTO: 9.8 K/UL (ref 1.8–7.7)
NEUTROPHILS NFR BLD: 68.2 % (ref 38–73)
NRBC BLD-RTO: 0 /100 WBC
PLATELET # BLD AUTO: 185 K/UL (ref 150–450)
PMV BLD AUTO: 12 FL (ref 9.2–12.9)
POCT GLUCOSE: 123 MG/DL (ref 70–110)
POCT GLUCOSE: 171 MG/DL (ref 70–110)
POTASSIUM SERPL-SCNC: 4.2 MMOL/L (ref 3.5–5.1)
RBC # BLD AUTO: 2.8 M/UL (ref 4–5.4)
SODIUM SERPL-SCNC: 137 MMOL/L (ref 136–145)
WBC # BLD AUTO: 14.4 K/UL (ref 3.9–12.7)

## 2021-11-15 PROCEDURE — G0378 HOSPITAL OBSERVATION PER HR: HCPCS

## 2021-11-15 PROCEDURE — 25000003 PHARM REV CODE 250: Performed by: ORTHOPAEDIC SURGERY

## 2021-11-15 PROCEDURE — 11000001 HC ACUTE MED/SURG PRIVATE ROOM

## 2021-11-15 PROCEDURE — 85025 COMPLETE CBC W/AUTO DIFF WBC: CPT | Performed by: ORTHOPAEDIC SURGERY

## 2021-11-15 PROCEDURE — 99233 SBSQ HOSP IP/OBS HIGH 50: CPT | Mod: ,,, | Performed by: PHYSICIAN ASSISTANT

## 2021-11-15 PROCEDURE — 97535 SELF CARE MNGMENT TRAINING: CPT

## 2021-11-15 PROCEDURE — 97530 THERAPEUTIC ACTIVITIES: CPT

## 2021-11-15 PROCEDURE — 25000003 PHARM REV CODE 250: Performed by: PHYSICIAN ASSISTANT

## 2021-11-15 PROCEDURE — 36415 COLL VENOUS BLD VENIPUNCTURE: CPT | Performed by: ORTHOPAEDIC SURGERY

## 2021-11-15 PROCEDURE — 94799 UNLISTED PULMONARY SVC/PX: CPT

## 2021-11-15 PROCEDURE — 80048 BASIC METABOLIC PNL TOTAL CA: CPT | Performed by: ORTHOPAEDIC SURGERY

## 2021-11-15 PROCEDURE — 94761 N-INVAS EAR/PLS OXIMETRY MLT: CPT

## 2021-11-15 PROCEDURE — 99233 PR SUBSEQUENT HOSPITAL CARE,LEVL III: ICD-10-PCS | Mod: ,,, | Performed by: PHYSICIAN ASSISTANT

## 2021-11-15 RX ADMIN — FAMOTIDINE 20 MG: 20 TABLET ORAL at 08:11

## 2021-11-15 RX ADMIN — FERROUS SULFATE TAB 325 MG (65 MG ELEMENTAL FE) 1 EACH: 325 (65 FE) TAB at 08:11

## 2021-11-15 RX ADMIN — ASPIRIN 81 MG CHEWABLE TABLET 81 MG: 81 TABLET CHEWABLE at 08:11

## 2021-11-15 RX ADMIN — HYDROCODONE BITARTRATE AND ACETAMINOPHEN 1 TABLET: 7.5; 325 TABLET ORAL at 08:11

## 2021-11-15 RX ADMIN — LIDOCAINE 5% 1 PATCH: 700 PATCH TOPICAL at 05:11

## 2021-11-15 NOTE — PROGRESS NOTES
[facility]  Wound Care    Patient Name:  Marylu Fenton   MRN:  [id number]  Date: 11/15/2021  Diagnosis: Closed left hip fracture    History:     Past Medical History:   Diagnosis Date    DM type 2, controlled, with complication 5/4/2016    Essential hypertension 5/4/2016    GERD (gastroesophageal reflux disease)        Social History     Socioeconomic History    Marital status:    Tobacco Use    Smoking status: Never Smoker   Substance and Sexual Activity    Alcohol use: No    Drug use: No       Precautions:     Allergies as of 11/10/2021    (No Known Allergies)       WO Assessment Details/Treatment     11/15**HAPI prevention, Severiano <18, PIP orders placed     11/15/21 0737   Severiano Risk Assessment   Sensory Perception 3-->slightly limited   Moisture 3-->occasionally moist   Activity 1-->bedfast   Mobility 2-->very limited   Nutrition 3-->adequate   Friction and Shear 3-->no apparent problem   Severiano Score 15       Recommendations made to primary team for HAPI prevention. Orders placed.     11/15/2021

## 2021-11-15 NOTE — PROGRESS NOTES
[facility] Hospital Medicine  Progress Note    Patient Name: Marylu Fenton  MRN: [id number]  Patient Class: IP- Inpatient   Admission Date: 11/10/2021  Length of Stay: 4 days  Attending Physician: Yesica Herndon MD  Primary Care Provider: Kathy Steele MD        Subjective:     Principal Problem:Closed left hip fracture        HPI:  Ms. Marylu Fenton is a 89 y.o. female, with PMH of ventral hernia, prior SBO, and OA of the knee, FERD, T2DM, HTN, who was transferring between walker and the toilet today when she had two falls. Her familt was unable to lift her after the first fall and the Fire Department was called, but she fell again after they lifted her into her wheelchair as she tried to get up to use the toilet. After the second fall, she was unable to stand and bear weight and thus EMS was called. Upon EMS arrival her LLE was shortened and rotated upon EMS arrival. She also noted right sided neck pain. She denied head injury or LOC during the falls, and denied light headedness. In the ED, imaging showed a left femoral neck fracture. Per Ortho she is to [facility] Thursday or Friday. She is placed on OBS.       Overview/Hospital Course:  89-year-old female admitted status post fall at home with displaced left femoral neck fracture.  Orthopedics was consulted patient underwent left hip hemiarthroplasty.      Interval History: + BM; good appetite, pain controlled    Review of Systems   Constitutional: Negative for chills and fever.   HENT: Negative for congestion.    Eyes: Negative.    Respiratory: Negative for cough.    Cardiovascular: Negative for chest pain.   Gastrointestinal: Negative for abdominal distention, abdominal pain, diarrhea, nausea and vomiting.   Genitourinary: Negative for difficulty urinating and dysuria.   Musculoskeletal: Positive for arthralgias.   Skin: Negative.    Neurological: Negative for dizziness, light-headedness and  headaches.     Objective:     Vital Signs (Most Recent):  Temp: 97.5 °F (36.4 °C) (11/15/21 0705)  Pulse: 92 (11/15/21 0705)  Resp: 18 (11/15/21 0824)  BP: 120/70 (11/15/21 0705)  SpO2: 96 % (11/15/21 0705) Vital Signs (24h Range):  Temp:  [97.4 °F (36.3 °C)-99.8 °F (37.7 °C)] 97.5 °F (36.4 °C)  Pulse:  [] 92  Resp:  [16-20] 18  SpO2:  [93 %-96 %] 96 %  BP: (100-125)/(56-71) 120/70     Weight: 72.1 kg (159 lb)  Body mass index is 24.9 kg/m².    Intake/Output Summary (Last 24 hours) at 11/15/2021 1124  Last data filed at 11/15/2021 0544  Gross per 24 hour   Intake --   Output 550 ml   Net -550 ml      Physical Exam  Vitals and nursing note reviewed.   Constitutional:       General: She is not in acute distress.     Appearance: Normal appearance. She is well-developed. She is not ill-appearing or diaphoretic.   HENT:      Head: Normocephalic and atraumatic.   Eyes:      General: No scleral icterus.     Conjunctiva/sclera: Conjunctivae normal.   Neck:      Trachea: No tracheal deviation.   Cardiovascular:      Rate and Rhythm: Normal rate and regular rhythm.      Heart sounds: Normal heart sounds.   Pulmonary:      Effort: Pulmonary effort is normal. No respiratory distress.      Breath sounds: Normal breath sounds. No wheezing.   Abdominal:      General: Bowel sounds are normal. There is no distension.      Palpations: Abdomen is soft.      Tenderness: There is no abdominal tenderness.   Musculoskeletal:      Cervical back: Normal range of motion and neck supple.      Comments: With left hip dressing in place, scattered dried blood noted  NVI   Skin:     General: Skin is warm and dry.   Neurological:      Mental Status: She is alert and oriented to person, place, and time.   Psychiatric:         Mood and Affect: Mood normal.         Significant Labs:   All pertinent labs within the past 24 hours have been reviewed.  CBC:   Recent Labs   Lab 11/14/21  0521 11/15/21  0521   WBC 19.61* 14.40*   HGB 8.4* 8.3*   HCT  26.3* 25.7*    185     CMP:   Recent Labs   Lab 11/14/21  0521 11/15/21  0521   * 137   K 4.0 4.2    105   CO2 21* 23   * 128*   BUN 28* 28*   CREATININE 0.8 0.8   CALCIUM 8.4* 8.4*   ANIONGAP 11 9   EGFRNONAA >60 >60       Significant Imaging: I have reviewed all pertinent imaging results/findings within the past 24 hours.      Assessment/Plan:      * Closed left hip fracture  - Ms. Marylu Fenton presents after 2 witnessed falls at home with left femoral neck fracture  - POD #4 s/p left hip hemiarthroplasty  - pain management, DVT prophylaxis ASA 81 mg BID  - PT/OT  - orthopedic recs noted  -  consulted for SNF, waiting placement  - discussed plan of care with patient,  and daughter who agree with plan    Hyperlipidemia  - continue simvastatin 10 mg QD           Essential hypertension  - normotensive at present   - patient states she no longer takes any blood pressure meds   - monitor       DM II (diabetes mellitus, type II), controlled  - last A1C: 5.4  - states she is not a diabetic not on medications        VTE Risk Mitigation (From admission, onward)         Ordered     IP VTE LOW RISK PATIENT  Once         11/13/21 1008     Place sequential compression device  Until discontinued         11/13/21 1008     Place sequential compression device  Until discontinued         11/10/21 2047                Discharge Planning   NOE:      Code Status: Full Code   Is the patient medically ready for discharge?:     Reason for patient still in hospital (select all that apply): Pending disposition                     Joyce Boudreaux PA-C  Department of Hospital Medicine   [facility]

## 2021-11-15 NOTE — ASSESSMENT & PLAN NOTE
- MsChina Marylu Fenton presents after 2 witnessed falls at home with left femoral neck fracture  - POD #4 s/p left hip hemiarthroplasty  - pain management, DVT prophylaxis ASA 81 mg BID  - PT/OT  - orthopedic recs noted  -  consulted for SNF, waiting placement  - discussed plan of care with patient,  and daughter who agree with plan

## 2021-11-15 NOTE — SUBJECTIVE & OBJECTIVE
Interval History: + BM; good appetite, pain controlled    Review of Systems   Constitutional: Negative for chills and fever.   HENT: Negative for congestion.    Eyes: Negative.    Respiratory: Negative for cough.    Cardiovascular: Negative for chest pain.   Gastrointestinal: Negative for abdominal distention, abdominal pain, diarrhea, nausea and vomiting.   Genitourinary: Negative for difficulty urinating and dysuria.   Musculoskeletal: Positive for arthralgias.   Skin: Negative.    Neurological: Negative for dizziness, light-headedness and headaches.     Objective:     Vital Signs (Most Recent):  Temp: 97.5 °F (36.4 °C) (11/15/21 0705)  Pulse: 92 (11/15/21 0705)  Resp: 18 (11/15/21 0824)  BP: 120/70 (11/15/21 0705)  SpO2: 96 % (11/15/21 0705) Vital Signs (24h Range):  Temp:  [97.4 °F (36.3 °C)-99.8 °F (37.7 °C)] 97.5 °F (36.4 °C)  Pulse:  [] 92  Resp:  [16-20] 18  SpO2:  [93 %-96 %] 96 %  BP: (100-125)/(56-71) 120/70     Weight: 72.1 kg (159 lb)  Body mass index is 24.9 kg/m².    Intake/Output Summary (Last 24 hours) at 11/15/2021 1124  Last data filed at 11/15/2021 0544  Gross per 24 hour   Intake --   Output 550 ml   Net -550 ml      Physical Exam  Vitals and nursing note reviewed.   Constitutional:       General: She is not in acute distress.     Appearance: Normal appearance. She is well-developed. She is not ill-appearing or diaphoretic.   HENT:      Head: Normocephalic and atraumatic.   Eyes:      General: No scleral icterus.     Conjunctiva/sclera: Conjunctivae normal.   Neck:      Trachea: No tracheal deviation.   Cardiovascular:      Rate and Rhythm: Normal rate and regular rhythm.      Heart sounds: Normal heart sounds.   Pulmonary:      Effort: Pulmonary effort is normal. No respiratory distress.      Breath sounds: Normal breath sounds. No wheezing.   Abdominal:      General: Bowel sounds are normal. There is no distension.      Palpations: Abdomen is soft.      Tenderness: There is no abdominal  tenderness.   Musculoskeletal:      Cervical back: Normal range of motion and neck supple.      Comments: With left hip dressing in place, scattered dried blood noted  NVI   Skin:     General: Skin is warm and dry.   Neurological:      Mental Status: She is alert and oriented to person, place, and time.   Psychiatric:         Mood and Affect: Mood normal.         Significant Labs:   All pertinent labs within the past 24 hours have been reviewed.  CBC:   Recent Labs   Lab 11/14/21  0521 11/15/21  0521   WBC 19.61* 14.40*   HGB 8.4* 8.3*   HCT 26.3* 25.7*    185     CMP:   Recent Labs   Lab 11/14/21  0521 11/15/21  0521   * 137   K 4.0 4.2    105   CO2 21* 23   * 128*   BUN 28* 28*   CREATININE 0.8 0.8   CALCIUM 8.4* 8.4*   ANIONGAP 11 9   EGFRNONAA >60 >60       Significant Imaging: I have reviewed all pertinent imaging results/findings within the past 24 hours.

## 2021-11-15 NOTE — PT/OT/SLP PROGRESS
Occupational Therapy   Treatment    Name: Marylu Fenton  MRN: [id number]  Admitting Diagnosis:  Closed left hip fracture  4 Days Post-Op    Recommendations:     Discharge Recommendations: nursing facility, skilled  Discharge Equipment Recommendations:  wheelchair,hospital bed  Barriers to discharge:  Decreased caregiver support    Assessment:     Marylu Fenton is a 89 y.o. female with a medical diagnosis of Closed left hip fracture s/p L hip hemiarthroplasty.  She presents agreeable to therapy . Performance deficits affecting function are weakness,impaired endurance,impaired self care skills,impaired functional mobilty,gait instability,impaired balance,impaired cognition,decreased upper extremity function,decreased lower extremity function,decreased safety awareness,pain,orthopedic precautions.     Pt continues to require extensive assist for all ADLS & mobility, flat affect with delayed initiation of all tasks, posterior lean with difficulty coming to upright sitting & full stand. Decreased pain than on previous sessions with this OT. Recommend continued OT services with SNF to maximize safe indep during all tasks.     Rehab Prognosis:  Fair; patient would benefit from acute skilled OT services to address these deficits and reach maximum level of function.       Plan:     Patient to be seen 5 x/week to address the above listed problems via self-care/home management,therapeutic activities,therapeutic exercises  · Plan of Care Expires: 12/12/21  · Plan of Care Reviewed with: patient    Subjective     Pain/Comfort:  · Pain Rating 1: 4/10  · Location - Side 1: Left  · Location - Orientation 1: generalized  · Location 1: hip  · Pain Addressed 1: Reposition,Cessation of Activity,Distraction  · Pain Rating Post-Intervention 1: 4/10    Objective:     Communicated with: CATHY Bergeron prior to session.  Patient found HOB elevated with bed alarm,peripheral IV,FCD,PureWick upon OT entry to room.    General Precautions:  "Standard, fall   Orthopedic Precautions:LLE anterior precautions,LLE weight bearing as tolerated   Braces: N/A  Respiratory Status:  · O2 Device (Oxygen Therapy): room air     Occupational Performance:     Bed Mobility:    · Patient completed Scooting/Bridging with maximal assistance  · Patient completed Supine to Sit with maximal assistance  · Patient completed Sit to Supine with maximal assistance     Functional Mobility/Transfers:  · Patient completed Sit <> Stand Transfer with maximal assistance and of 2 persons  with  rolling walker and x3 trials with difficulty coming to full stand   · Functional Mobility:  · EOB sitting: SBA-CGA x15" with posterior lean  · Static standing: Max A with forward hunched posture  · Assist to weight shift to L & promote upright trunk     Activities of Daily Living:  · Grooming: maximal assistance thoroughness of hair care seated EOB  · Bathing: total assistance sponge bath molly area       AMPAC 6 Click ADL: 10    Treatment & Education:  Pt participated in OT tx session with ADLS & functional mobility performed as documented above.  Education provided on role of OT including safe performance of self-care tasks, mobility techniques, safe use of DME, and encouragement of mobilization during hospitalization to prevent/limit deconditioning as well as discharge recommendations     Patient left HOB elevated with all lines intact, call button in reach and bed alarm onEducation:      GOALS:   Multidisciplinary Problems     Occupational Therapy Goals        Problem: Occupational Therapy Goal    Goal Priority Disciplines Outcome Interventions   Occupational Therapy Goal     OT, PT/OT Ongoing, Progressing    Description: Goals to be met by: 11/19/2021     Patient will increase functional independence with ADLs by performing:    UE Dressing with Minimal Assistance.  LE Dressing with Moderate Assistance and Assistive Devices as needed.  Grooming while EOB with Supervision.  Sitting at edge of " bed x15 minutes with Supervision without use of Ues   Toilet transfer to bedside commode with Moderate Assistance.                     Time Tracking:     OT Date of Treatment: 11/15/21  OT Start Time: 1028  OT Stop Time: 1056  OT Total Time (min): 28 min    Billable Minutes:Self Care/Home Management 15  Therapeutic Activity 13   Overlap with PT for portions of session due to complex nature of patient and for safety with mobility and performance of self-care tasks to decrease fall risk as well as patient and caregiver injury as pt requires two skilled therapists to provide interventions.      OT/OUSMANE: OT          11/15/2021

## 2021-11-15 NOTE — PLAN OF CARE
Patient is resting in bed in no acute distress/shortness of breath. Patient has tolerated PRN pain medication well and only asked for it once during shift. All safety precautions are in place including call light within reach.

## 2021-11-15 NOTE — PT/OT/SLP PROGRESS
Physical Therapy Treatment    Patient Name:  Marylu Fenton   MRN:  [id number]    Recommendations:     Discharge Recommendations:  nursing facility, skilled   Discharge Equipment Recommendations: wheelchair,hospital bed   Barriers to discharge: none to SNF    Assessment:     Marylu Fenton is a 89 y.o. female admitted with a medical diagnosis of Closed left hip fracture.  She presents with the following impairments/functional limitations:  weakness,impaired endurance,impaired self care skills,impaired functional mobilty,gait instability,impaired balance,orthopedic precautions,pain,decreased lower extremity function,impaired joint extensibility.    Pt tolerated treatment well with no adverse reactions. Pt is progressing toward meeting goals. Pt performed bed mobility and transfers with max A x2 and RW. Demonstrated improved tolerance to edge of bed mobility. Pt continues to be limited by weakness, pain and impaired activity tolerance. PT will continue to follow and progress goals as tolerated. Recommend discharge to SNF.     Rehab Prognosis: Good; patient would benefit from acute skilled PT services to address these deficits and reach maximum level of function.    Recent Surgery: Procedure(s) (LRB):  HEMIARTHROPLASTY, HIP (Left) 4 Days Post-Op    Plan:     During this hospitalization, patient to be seen 5 x/week to address the identified rehab impairments via gait training,therapeutic activities,therapeutic exercises,neuromuscular re-education and progress toward the following goals:    · Plan of Care Expires:  12/12/21    Subjective     Chief Complaint: left hip pain  Patient/Family Comments/goals: none stated at this time  Pain/Comfort:  · Pain Rating 1: 4/10  · Location - Side 1: Left  · Location - Orientation 1: generalized  · Location 1: hip  · Pain Addressed 1: Reposition,Distraction,Cessation of Activity  · Pain Rating Post-Intervention 1: 4/10      Objective:     Communicated with CATHY Bergeron prior to session.   Patient found HOB elevated with bed alarm,peripheral IV,PureWick,FCD upon PT entry to room.     General Precautions: Standard, fall   Orthopedic Precautions:LLE weight bearing as tolerated,LLE anterior precautions   Braces: N/A  Respiratory Status:  · O2 Device (Oxygen Therapy): room air     Functional Mobility:  · Bed Mobility:     · Supine to Sit: maximal assistance of 2 persons  · Sit to Supine: maximal assistance of 2 persons  · Transfers:     · Sit to Stand:  maximal assistance of 2 persons with rolling walker  · Three trials performed; pt with difficulty tucking bottom and lifting chest to come to full stand   · Static standing: Max A with forward-flexed posture  · Assist to weight shift to L & promote upright trunk via verbal and tactile cues    AM-PAC 6 CLICK MOBILITY  Turning over in bed (including adjusting bedclothes, sheets and blankets)?: 2  Sitting down on and standing up from a chair with arms (e.g., wheelchair, bedside commode, etc.): 2  Moving from lying on back to sitting on the side of the bed?: 2  Moving to and from a bed to a chair (including a wheelchair)?: 2  Need to walk in hospital room?: 1  Climbing 3-5 steps with a railing?: 1  Basic Mobility Total Score: 10     Therapeutic Activities and Exercises:  · Bed mobility and transfer training as described above.  · Pt sat EOB for ~15 minutes with SBA - CGA and posterior lean.     PT educated patient re:   · PT plan of care/role of PT  · Use of RW  · Fall and safety precautions  · Use of call light (don't get up without assistance)  Pt verbalized understanding, however, needs reinforcement.     Patient left with bed in chair position with all lines intact, call button in reach and bed alarm on.    GOALS:   Multidisciplinary Problems     Physical Therapy Goals        Problem: Physical Therapy Goal    Goal Priority Disciplines Outcome Goal Variances Interventions   Physical Therapy Goal     PT, PT/OT Ongoing, Progressing     Description: Goals to  be met by: 12/12/21    Patient will perform the following to increase strength, improve mobility, and return to prior level of function:    1. Rolling to L and R with min A  2. Supine <> sit with min A.  3. Sit EOB x 15 min with SBA for balance.  4. Transfer sit <> stand with min A and LRAD.                    Time Tracking:     PT Received On: 11/15/21  PT Start Time: 1029     PT Stop Time: 1056  PT Total Time (min): 27 min     Billable Minutes: Therapeutic Activity 27   Overlap with OT for portions of session due to complex nature of patient and for safety with mobility to decrease fall risk for patient and caregiver injury requiring two skilled therapists to provide interventions.     Treatment Type: Treatment  PT/PTA: PT     PTA Visit Number: 0     11/15/2021

## 2021-11-16 LAB
ANION GAP SERPL CALC-SCNC: 9 MMOL/L (ref 8–16)
BASOPHILS # BLD AUTO: 0.03 K/UL (ref 0–0.2)
BASOPHILS NFR BLD: 0.3 % (ref 0–1.9)
BUN SERPL-MCNC: 26 MG/DL (ref 8–23)
CALCIUM SERPL-MCNC: 8.2 MG/DL (ref 8.7–10.5)
CHLORIDE SERPL-SCNC: 102 MMOL/L (ref 95–110)
CO2 SERPL-SCNC: 25 MMOL/L (ref 23–29)
CREAT SERPL-MCNC: 0.7 MG/DL (ref 0.5–1.4)
DIFFERENTIAL METHOD: ABNORMAL
EOSINOPHIL # BLD AUTO: 0.4 K/UL (ref 0–0.5)
EOSINOPHIL NFR BLD: 3.4 % (ref 0–8)
ERYTHROCYTE [DISTWIDTH] IN BLOOD BY AUTOMATED COUNT: 13.3 % (ref 11.5–14.5)
EST. GFR  (AFRICAN AMERICAN): >60 ML/MIN/1.73 M^2
EST. GFR  (NON AFRICAN AMERICAN): >60 ML/MIN/1.73 M^2
GLUCOSE SERPL-MCNC: 115 MG/DL (ref 70–110)
HCT VFR BLD AUTO: 25.4 % (ref 37–48.5)
HGB BLD-MCNC: 8.1 G/DL (ref 12–16)
IMM GRANULOCYTES # BLD AUTO: 0.24 K/UL (ref 0–0.04)
IMM GRANULOCYTES NFR BLD AUTO: 2.3 % (ref 0–0.5)
LYMPHOCYTES # BLD AUTO: 2.2 K/UL (ref 1–4.8)
LYMPHOCYTES NFR BLD: 21.2 % (ref 18–48)
MCH RBC QN AUTO: 29.5 PG (ref 27–31)
MCHC RBC AUTO-ENTMCNC: 31.9 G/DL (ref 32–36)
MCV RBC AUTO: 92 FL (ref 82–98)
MONOCYTES # BLD AUTO: 1.6 K/UL (ref 0.3–1)
MONOCYTES NFR BLD: 15.2 % (ref 4–15)
NEUTROPHILS # BLD AUTO: 6.1 K/UL (ref 1.8–7.7)
NEUTROPHILS NFR BLD: 57.6 % (ref 38–73)
NRBC BLD-RTO: 0 /100 WBC
PLATELET # BLD AUTO: 213 K/UL (ref 150–450)
PMV BLD AUTO: 11.6 FL (ref 9.2–12.9)
POCT GLUCOSE: 194 MG/DL (ref 70–110)
POCT GLUCOSE: 237 MG/DL (ref 70–110)
POTASSIUM SERPL-SCNC: 4.8 MMOL/L (ref 3.5–5.1)
RBC # BLD AUTO: 2.75 M/UL (ref 4–5.4)
SARS-COV-2 RDRP RESP QL NAA+PROBE: NEGATIVE
SODIUM SERPL-SCNC: 136 MMOL/L (ref 136–145)
WBC # BLD AUTO: 10.57 K/UL (ref 3.9–12.7)

## 2021-11-16 PROCEDURE — 97110 THERAPEUTIC EXERCISES: CPT

## 2021-11-16 PROCEDURE — 30200315 PPD INTRADERMAL TEST REV CODE 302: Performed by: PHYSICIAN ASSISTANT

## 2021-11-16 PROCEDURE — 94799 UNLISTED PULMONARY SVC/PX: CPT

## 2021-11-16 PROCEDURE — 85025 COMPLETE CBC W/AUTO DIFF WBC: CPT | Performed by: ORTHOPAEDIC SURGERY

## 2021-11-16 PROCEDURE — G0378 HOSPITAL OBSERVATION PER HR: HCPCS

## 2021-11-16 PROCEDURE — 86580 TB INTRADERMAL TEST: CPT | Performed by: PHYSICIAN ASSISTANT

## 2021-11-16 PROCEDURE — 36415 COLL VENOUS BLD VENIPUNCTURE: CPT | Performed by: ORTHOPAEDIC SURGERY

## 2021-11-16 PROCEDURE — 94761 N-INVAS EAR/PLS OXIMETRY MLT: CPT

## 2021-11-16 PROCEDURE — 80048 BASIC METABOLIC PNL TOTAL CA: CPT | Performed by: ORTHOPAEDIC SURGERY

## 2021-11-16 PROCEDURE — 99232 PR SUBSEQUENT HOSPITAL CARE,LEVL II: ICD-10-PCS | Mod: ,,, | Performed by: PHYSICIAN ASSISTANT

## 2021-11-16 PROCEDURE — 11000001 HC ACUTE MED/SURG PRIVATE ROOM

## 2021-11-16 PROCEDURE — U0002 COVID-19 LAB TEST NON-CDC: HCPCS | Performed by: PHYSICIAN ASSISTANT

## 2021-11-16 PROCEDURE — 96372 THER/PROPH/DIAG INJ SC/IM: CPT

## 2021-11-16 PROCEDURE — 25000003 PHARM REV CODE 250: Performed by: ORTHOPAEDIC SURGERY

## 2021-11-16 PROCEDURE — 99232 SBSQ HOSP IP/OBS MODERATE 35: CPT | Mod: ,,, | Performed by: PHYSICIAN ASSISTANT

## 2021-11-16 RX ORDER — ASPIRIN 81 MG/1
81 TABLET ORAL 2 TIMES DAILY
Refills: 0
Start: 2021-11-16

## 2021-11-16 RX ADMIN — TUBERCULIN PURIFIED PROTEIN DERIVATIVE 5 UNITS: 5 INJECTION, SOLUTION INTRADERMAL at 11:11

## 2021-11-16 RX ADMIN — INSULIN ASPART 1 UNITS: 100 INJECTION, SOLUTION INTRAVENOUS; SUBCUTANEOUS at 09:11

## 2021-11-16 RX ADMIN — POLYETHYLENE GLYCOL 3350 17 G: 17 POWDER, FOR SOLUTION ORAL at 08:11

## 2021-11-16 RX ADMIN — FAMOTIDINE 20 MG: 20 TABLET ORAL at 08:11

## 2021-11-16 RX ADMIN — ASPIRIN 81 MG CHEWABLE TABLET 81 MG: 81 TABLET CHEWABLE at 08:11

## 2021-11-16 RX ADMIN — FERROUS SULFATE TAB 325 MG (65 MG ELEMENTAL FE) 1 EACH: 325 (65 FE) TAB at 08:11

## 2021-11-16 RX ADMIN — LIDOCAINE 5% 1 PATCH: 700 PATCH TOPICAL at 05:11

## 2021-11-16 RX ADMIN — ACETAMINOPHEN 650 MG: 325 TABLET, FILM COATED ORAL at 09:11

## 2021-11-16 RX ADMIN — ASPIRIN 81 MG CHEWABLE TABLET 81 MG: 81 TABLET CHEWABLE at 09:11

## 2021-11-16 NOTE — PROGRESS NOTES
"[facility] Hospital Medicine  Progress Note    Patient Name: Marylu Fenton  MRN: [id number]  Patient Class: IP- Inpatient   Admission Date: 11/10/2021  Length of Stay: 5 days  Attending Physician: Yesica Herndon MD  Primary Care Provider: Kathy Steele MD        Subjective:     Principal Problem:Closed left hip fracture        HPI:  Per Shira Henry, PA:  "Ms. Marylu Fenton is a 89 y.o. female, with PMH of ventral hernia, prior SBO, and OA of the knee, FERD, T2DM, HTN, who was transferring between walker and the toilet today when she had two falls. Her familt was unable to lift her after the first fall and the Fire Department was called, but she fell again after they lifted her into her wheelchair as she tried to get up to use the toilet. After the second fall, she was unable to stand and bear weight and thus EMS was called. Upon EMS arrival her LLE was shortened and rotated upon EMS arrival. She also noted right sided neck pain. She denied head injury or LOC during the falls, and denied light headedness. In the ED, imaging showed a left femoral neck fracture. Per Ortho she is to [facility] N[facility] fixation Thursday or Friday. She is placed on OBS. "      Overview/Hospital Course:  89-year-old female admitted status post fall at home with displaced left femoral neck fracture.  Orthopedics was consulted patient underwent left hip hemiarthroplasty.      Interval History:  No events overnight.  Pain well controlled.  Good appetite.  Awaiting skilled nursing facility placement.    Review of Systems   Constitutional: Negative.  Negative for appetite change.   Respiratory: Negative for cough, shortness of breath and wheezing.    Cardiovascular: Negative for chest pain, palpitations and leg swelling.   Gastrointestinal: Negative for abdominal pain, constipation, diarrhea, nausea and vomiting.   Genitourinary: Negative for difficulty urinating.     Objective:     Vital Signs " (Most Recent):  Temp: 98.7 °F (37.1 °C) (11/16/21 1553)  Pulse: 95 (11/16/21 1553)  Resp: 18 (11/16/21 1553)  BP: 121/61 (11/16/21 1553)  SpO2: 97 % (11/16/21 1553) Vital Signs (24h Range):  Temp:  [97.4 °F (36.3 °C)-99.3 °F (37.4 °C)] 98.7 °F (37.1 °C)  Pulse:  [88-97] 95  Resp:  [16-18] 18  SpO2:  [94 %-98 %] 97 %  BP: (115-124)/(57-62) 121/61     Weight: 72.1 kg (159 lb)  Body mass index is 24.9 kg/m².    Intake/Output Summary (Last 24 hours) at 11/16/2021 1716  Last data filed at 11/16/2021 1650  Gross per 24 hour   Intake --   Output 1100 ml   Net -1100 ml      Physical Exam  Vitals and nursing note reviewed.   Constitutional:       General: She is not in acute distress.     Appearance: Normal appearance.   Cardiovascular:      Rate and Rhythm: Normal rate and regular rhythm.      Pulses: Normal pulses.      Heart sounds: Normal heart sounds. No murmur heard.      Pulmonary:      Effort: Pulmonary effort is normal.      Breath sounds: Normal breath sounds. No wheezing or rales.   Abdominal:      General: Bowel sounds are normal. There is no distension.      Palpations: Abdomen is soft.      Tenderness: There is no abdominal tenderness.   Musculoskeletal:      Right lower leg: No edema.      Left lower leg: No edema.      Comments: Left hip dressing in place, scattered dried blood noted.  NVI.   Skin:     General: Skin is warm and dry.   Neurological:      General: No focal deficit present.      Mental Status: She is alert and oriented to person, place, and time. Mental status is at baseline.   Psychiatric:         Behavior: Behavior normal.         Thought Content: Thought content normal.         Significant Labs:   All pertinent labs within the past 24 hours have been reviewed.  CBC:   Recent Labs   Lab 11/15/21  0521 11/16/21  0522   WBC 14.40* 10.57   HGB 8.3* 8.1*   HCT 25.7* 25.4*    213     CMP:   Recent Labs   Lab 11/15/21  0521 11/16/21  0522    136   K 4.2 4.8    102   CO2 23 25    * 115*   BUN 28* 26*   CREATININE 0.8 0.7   CALCIUM 8.4* 8.2*   ANIONGAP 9 9   EGFRNONAA >60 >60       Significant Imaging: I have reviewed all pertinent imaging results/findings within the past 24 hours.      Assessment/Plan:      * Closed left hip fracture  - Ms. Marylu Fenton presents after 2 witnessed falls at home with left femoral neck fracture  - s/p left hip hemiarthroplasty on 11/11/21  - pain management, DVT prophylaxis with ASA 81 mg BID  - PT/OT  - orthopedic recs noted  - IS  -  consulted for SNF, waiting placement  - discussed plan of care with patient,  and daughter who agree with plan      Essential hypertension  - normotensive  - patient states she no longer takes any blood pressure meds     DM II (diabetes mellitus, type II), controlled  - last A1C: 5.4  - diet controlled        VTE Risk Mitigation (From admission, onward)         Ordered     IP VTE LOW RISK PATIENT  Once         11/13/21 1008     Place sequential compression device  Until discontinued         11/13/21 1008     Place sequential compression device  Until discontinued         11/10/21 2047                Discharge Planning   NOE:      Code Status: Full Code   Is the patient medically ready for discharge?:     Reason for patient still in hospital (select all that apply): Pending disposition  Discharge Plan A: Skilled Nursing Facility                  Mabel Huber PA-C  Department of Hospital Medicine   [facility]

## 2021-11-16 NOTE — PLAN OF CARE
Patient has remained free from falls and injury.  Pain has been well controlled today.  Healthy appetite consuming % of meals.  Working with PT and OT, tolerating fairly well.  VSS.  NAD noted.   POC reviewed with patient and daughter via phone.  Will continue monitoring.  Problem: Skin Injury Risk Increased  Goal: Skin Health and Integrity  Outcome: Ongoing, Progressing     Problem: Pain Acute  Goal: Optimal Pain Control  Outcome: Ongoing, Progressing

## 2021-11-16 NOTE — PLAN OF CARE
Called PHN; spoke with Ashleigh;  Obtained case # [id number]. Pending accepting facility confirmation     Called Bere with [facility] at [phone]; attempted to confirm acceptance; LVM to return call    1630- Received call back from Bere with [facility]- confirmed PHN auth obtained; Pending call to daughter to discuss admission paperwork needed upon arrival to facility, requesting patient am admission  1640- Uploaded PASRR, 142 to [facility]    Uploaded SNF order, [facility]; DC in the am

## 2021-11-16 NOTE — PLAN OF CARE
[facility]  Discharge Reassessment    Primary Care Provider: Kathy Steele MD    Admission Diagnosis: Fall [W19.XXXA]  Closed fracture of left hip, initial encounter [S72.002A]    Admission Date: 11/10/2021  Expected Discharge Date: TBD       Payor: [employer] MEDICARE / Plan: [employer] 65 / Product Type: Medicare Advantage /       Discharge Plan A: Skilled Nursing Facility  Discharge Plan B: Home Health      Pending PHN Auth; as per Mónica with [employer], SNF request for Auth received, under review    Pending SNF acceptance; referrals sent to:     [facility] [phone]; willing to accept  [facility] [phone]- no available beds  [facility] -under review

## 2021-11-16 NOTE — PLAN OF CARE
Patient is resting in bed in no acute distress/shortness of breath. Patient is tolerating purwick well and c/o minimal pain tolerated with PRN medication only once requested through shift. All safety precautions are in place including call light within reach.

## 2021-11-16 NOTE — PT/OT/SLP PROGRESS
Physical Therapy Treatment    Patient Name:  Marylu Fenton   MRN:  [id number]    Recommendations:     Discharge Recommendations:  nursing facility, skilled   Discharge Equipment Recommendations: hospital bed,wheelchair   Barriers to discharge: none to SNF    Assessment:     Marylu Fenton is a 89 y.o. female admitted with a medical diagnosis of Closed left hip fracture. She is s/p left hip hemiarthroplasty. She presents with the following impairments/functional limitations:  weakness,impaired endurance,impaired functional mobilty,impaired self care skills,gait instability,impaired balance,pain,decreased ROM,impaired joint extensibility,orthopedic precautions.    Pt tolerated treatment fairly with no adverse reactions. Pt is progressing toward meeting goals. Pt performed therex during this session in attempts to strengthen bilateral LEs. Demonstrated improved pain control. Pt continues to be limited by left hip pain and generalized weakness. PT will continue to follow and progress goals as tolerated. Recommend discharge to SNF.     Rehab Prognosis: Good; patient would benefit from acute skilled PT services to address these deficits and reach maximum level of function.    Recent Surgery: Procedure(s) (LRB):  HEMIARTHROPLASTY, HIP (Left) 5 Days Post-Op    Plan:     During this hospitalization, patient to be seen 5 x/week to address the identified rehab impairments via gait training,therapeutic activities,therapeutic exercises,neuromuscular re-education and progress toward the following goals:    · Plan of Care Expires:  12/12/21    Subjective     Chief Complaint: left hip pain  Patient/Family Comments/goals: Pt's daughter noted that pt was weak prior to hip fracture and surgery.   Pain/Comfort:  · Pain Rating 1: 4/10  · Location - Side 1: Left  · Location - Orientation 1: generalized  · Location 1: hip  · Pain Addressed 1: Reposition,Distraction,Cessation of Activity  · Pain Rating Post-Intervention 1:  4/10      Objective:     Communicated with RN prior to session.  Patient found HOB elevated with peripheral IV,PureWick,SCD,bed alarm upon PT entry to room.     General Precautions: Standard, fall   Orthopedic Precautions:LLE weight bearing as tolerated,LLE anterior precautions   Braces: N/A  Respiratory Status:  · O2 Device (Oxygen Therapy): room air     AM-PAC 6 CLICK MOBILITY  Turning over in bed (including adjusting bedclothes, sheets and blankets)?: 2  Sitting down on and standing up from a chair with arms (e.g., wheelchair, bedside commode, etc.): 2  Moving from lying on back to sitting on the side of the bed?: 2  Moving to and from a bed to a chair (including a wheelchair)?: 2  Need to walk in hospital room?: 1  Climbing 3-5 steps with a railing?: 1  Basic Mobility Total Score: 10     Therapeutic Activities and Exercises:  · Pt performed the following therex in supine position with HOB elevated; verbal and tactile cues required for therex technique.   · Ankle pumps (3x10 bilaterally)  · Quad sets (2x10 bilaterally)   · Heel slides (AAROM left, AROM right) (2x10 bilaterally)   · Right hip active abduction (AAROM) (1x10 right only)   · UE cycling (forwards, backwards - 1 minute each direction)     PT educated patient and family re:   · PT plan of care/role of PT  · Therex  · Fall and safety precautions  · Use of call light (don't get up without assistance)  Pt verbalized understanding, however, needs reinforcement.     Patient left HOB elevated with all lines intact, call button in reach, bed alarm on and family present.    GOALS:   Multidisciplinary Problems     Physical Therapy Goals        Problem: Physical Therapy Goal    Goal Priority Disciplines Outcome Goal Variances Interventions   Physical Therapy Goal     PT, PT/OT Ongoing, Progressing     Description: Goals to be met by: 12/12/21    Patient will perform the following to increase strength, improve mobility, and return to prior level of function:    1.  Rolling to L and R with min A  2. Supine <> sit with min A.  3. Sit EOB x 15 min with SBA for balance.  4. Transfer sit <> stand with min A and LRAD.                    Time Tracking:     PT Received On: 11/16/21  PT Start Time: 1418     PT Stop Time: 1442  PT Total Time (min): 24 min     Billable Minutes: Therapeutic Exercise 24    Treatment Type: Treatment  PT/PTA: PT     PTA Visit Number: 0     11/16/2021

## 2021-11-16 NOTE — SUBJECTIVE & OBJECTIVE
Interval History:  No events overnight.  Pain well controlled.  Good appetite.  Awaiting skilled nursing facility placement.    Review of Systems   Constitutional: Negative.  Negative for appetite change.   Respiratory: Negative for cough, shortness of breath and wheezing.    Cardiovascular: Negative for chest pain, palpitations and leg swelling.   Gastrointestinal: Negative for abdominal pain, constipation, diarrhea, nausea and vomiting.   Genitourinary: Negative for difficulty urinating.     Objective:     Vital Signs (Most Recent):  Temp: 98.7 °F (37.1 °C) (11/16/21 1553)  Pulse: 95 (11/16/21 1553)  Resp: 18 (11/16/21 1553)  BP: 121/61 (11/16/21 1553)  SpO2: 97 % (11/16/21 1553) Vital Signs (24h Range):  Temp:  [97.4 °F (36.3 °C)-99.3 °F (37.4 °C)] 98.7 °F (37.1 °C)  Pulse:  [88-97] 95  Resp:  [16-18] 18  SpO2:  [94 %-98 %] 97 %  BP: (115-124)/(57-62) 121/61     Weight: 72.1 kg (159 lb)  Body mass index is 24.9 kg/m².    Intake/Output Summary (Last 24 hours) at 11/16/2021 1716  Last data filed at 11/16/2021 1650  Gross per 24 hour   Intake --   Output 1100 ml   Net -1100 ml      Physical Exam  Vitals and nursing note reviewed.   Constitutional:       General: She is not in acute distress.     Appearance: Normal appearance.   Cardiovascular:      Rate and Rhythm: Normal rate and regular rhythm.      Pulses: Normal pulses.      Heart sounds: Normal heart sounds. No murmur heard.      Pulmonary:      Effort: Pulmonary effort is normal.      Breath sounds: Normal breath sounds. No wheezing or rales.   Abdominal:      General: Bowel sounds are normal. There is no distension.      Palpations: Abdomen is soft.      Tenderness: There is no abdominal tenderness.   Musculoskeletal:      Right lower leg: No edema.      Left lower leg: No edema.      Comments: Left hip dressing in place, scattered dried blood noted.  NVI.   Skin:     General: Skin is warm and dry.   Neurological:      General: No focal deficit present.       Mental Status: She is alert and oriented to person, place, and time. Mental status is at baseline.   Psychiatric:         Behavior: Behavior normal.         Thought Content: Thought content normal.         Significant Labs:   All pertinent labs within the past 24 hours have been reviewed.  CBC:   Recent Labs   Lab 11/15/21  0521 11/16/21  0522   WBC 14.40* 10.57   HGB 8.3* 8.1*   HCT 25.7* 25.4*    213     CMP:   Recent Labs   Lab 11/15/21  0521 11/16/21  0522    136   K 4.2 4.8    102   CO2 23 25   * 115*   BUN 28* 26*   CREATININE 0.8 0.7   CALCIUM 8.4* 8.2*   ANIONGAP 9 9   EGFRNONAA >60 >60       Significant Imaging: I have reviewed all pertinent imaging results/findings within the past 24 hours.

## 2021-11-16 NOTE — PLAN OF CARE
NURSING HOME ORDERS    11/17/2021  [facility] LOCATION ([facility])  [facility]  [address]  [address]  [facility] [address]  Dept: [phone]  Loc: [phone]     Admit to Nursing Home:  Skilled bed    Diagnoses:  Active Hospital Problems    Diagnosis  POA    *Closed left hip fracture [S72.002A]  Yes    DM II (diabetes mellitus, type II), controlled [E11.9]  Yes    Hyperlipidemia [E78.5]  Yes    Essential hypertension [I10]  Yes      Resolved Hospital Problems   No resolved problems to display.       Patient is homebound due to:  Closed left hip fracture    Allergies:Review of patient's allergies indicates:  No Known Allergies    Vitals:  Routine    Diet: regular diet    Activities:   Activity as tolerated and Weight bearing status: weight bearing as tolerated: left leg    Nursing Precautions:  Fall and Pressure ulcer prevention    Consults:   PT to evaluate and treat- 5 times a week, OT to evaluate and treat- 5 times a week, Wound Care and Nutrition to evaluate and recommend diet          Medications: Discontinue all previous medication orders, if any. See new list below.     Medication List      CHANGE how you take these medications    aspirin 81 MG EC tablet  Commonly known as: ECOTRIN  Take 1 tablet (81 mg total) by mouth 2 (two) times a day.  What changed: when to take this        CONTINUE taking these medications    atorvastatin 10 MG tablet  Commonly known as: LIPITOR  Take 10 mg by mouth once daily.     CASTELLANI PAINT 1.5 % Liqd  Generic drug: phenoL  Apply topically to fungal infection between toes once daily.     diclofenac sodium 1 % Gel  Commonly known as: VOLTAREN  Apply topically 2 (two) times daily as needed. (for pain). Apply to both knees and big toes     dorzolamide 2 % ophthalmic solution  Commonly known as: TRUSOPT  Place 1 drop into the right eye every morning.     ferrous sulfate 325 (65 FE) MG EC tablet  Take 1 tablet (325 mg total) by mouth once daily.      HYDROcodone-acetaminophen 7.5-325 mg per tablet  Commonly known as: NORCO  Take 1 tablet by mouth every 6 (six) hours as needed for Pain.     latanoprost 0.005 % ophthalmic solution  Place 1 drop into the right eye every evening.     multivitamin tablet  Commonly known as: THERAGRAN  Take 1 tablet by mouth once daily.     polyethylene glycol 17 gram Pwpk  Commonly known as: GLYCOLAX  Take 17 g by mouth every other day.     senna-docusate 8.6-50 mg 8.6-50 mg per tablet  Commonly known as: PERICOLACE  Take 1 tablet by mouth 2 (two) times daily.     timolol maleate 0.5% 0.5 % Drop  Commonly known as: TIMOPTIC  Place 1 drop into the right eye once daily.        STOP taking these medications    amLODIPine 5 MG tablet  Commonly known as: NORVASC     hydroCHLOROthiazide 25 MG tablet  Commonly known as: HYDRODIURIL                 _________________________________  Mabel Huber PA-C  11/17/2021

## 2021-11-17 VITALS
TEMPERATURE: 98 F | RESPIRATION RATE: 16 BRPM | DIASTOLIC BLOOD PRESSURE: 63 MMHG | BODY MASS INDEX: 24.96 KG/M2 | HEIGHT: 67 IN | OXYGEN SATURATION: 97 % | WEIGHT: 159 LBS | SYSTOLIC BLOOD PRESSURE: 130 MMHG | HEART RATE: 86 BPM

## 2021-11-17 LAB
POCT GLUCOSE: 182 MG/DL (ref 70–110)
POCT GLUCOSE: 218 MG/DL (ref 70–110)

## 2021-11-17 PROCEDURE — 96372 THER/PROPH/DIAG INJ SC/IM: CPT

## 2021-11-17 PROCEDURE — G0378 HOSPITAL OBSERVATION PER HR: HCPCS

## 2021-11-17 PROCEDURE — 25000003 PHARM REV CODE 250: Performed by: PHYSICIAN ASSISTANT

## 2021-11-17 PROCEDURE — 99239 HOSP IP/OBS DSCHRG MGMT >30: CPT | Mod: ,,, | Performed by: PHYSICIAN ASSISTANT

## 2021-11-17 PROCEDURE — 63600175 PHARM REV CODE 636 W HCPCS: Performed by: ORTHOPAEDIC SURGERY

## 2021-11-17 PROCEDURE — 25000003 PHARM REV CODE 250: Performed by: ORTHOPAEDIC SURGERY

## 2021-11-17 PROCEDURE — 99239 PR HOSPITAL DISCHARGE DAY,>30 MIN: ICD-10-PCS | Mod: ,,, | Performed by: PHYSICIAN ASSISTANT

## 2021-11-17 PROCEDURE — 11000001 HC ACUTE MED/SURG PRIVATE ROOM

## 2021-11-17 PROCEDURE — 94799 UNLISTED PULMONARY SVC/PX: CPT

## 2021-11-17 RX ORDER — ATORVASTATIN CALCIUM 10 MG/1
10 TABLET, FILM COATED ORAL DAILY
COMMUNITY

## 2021-11-17 RX ORDER — PHENOL 15 MG/ML
LIQUID TOPICAL
COMMUNITY
Start: 2021-10-12

## 2021-11-17 RX ORDER — ATORVASTATIN CALCIUM 10 MG/1
10 TABLET, FILM COATED ORAL DAILY
Status: ON HOLD | COMMUNITY
Start: 2021-10-13 | End: 2021-11-17 | Stop reason: CLARIF

## 2021-11-17 RX ORDER — HYDROCODONE BITARTRATE AND ACETAMINOPHEN 7.5; 325 MG/1; MG/1
1 TABLET ORAL EVERY 6 HOURS PRN
Qty: 15 TABLET | Refills: 0 | Status: SHIPPED | OUTPATIENT
Start: 2021-11-17

## 2021-11-17 RX ADMIN — DOCUSATE SODIUM AND SENNOSIDES 1 TABLET: 8.6; 5 TABLET, FILM COATED ORAL at 08:11

## 2021-11-17 RX ADMIN — ASPIRIN 81 MG CHEWABLE TABLET 81 MG: 81 TABLET CHEWABLE at 09:11

## 2021-11-17 RX ADMIN — HYDROCODONE BITARTRATE AND ACETAMINOPHEN 1 TABLET: 7.5; 325 TABLET ORAL at 08:11

## 2021-11-17 RX ADMIN — FAMOTIDINE 20 MG: 20 TABLET ORAL at 09:11

## 2021-11-17 RX ADMIN — DOCUSATE SODIUM AND SENNOSIDES 1 TABLET: 8.6; 5 TABLET, FILM COATED ORAL at 09:11

## 2021-11-17 RX ADMIN — INSULIN ASPART 1 UNITS: 100 INJECTION, SOLUTION INTRAVENOUS; SUBCUTANEOUS at 08:11

## 2021-11-17 RX ADMIN — HYDROCODONE BITARTRATE AND ACETAMINOPHEN 1 TABLET: 7.5; 325 TABLET ORAL at 04:11

## 2021-11-17 RX ADMIN — FERROUS SULFATE TAB 325 MG (65 MG ELEMENTAL FE) 1 EACH: 325 (65 FE) TAB at 09:11

## 2021-11-17 RX ADMIN — POLYETHYLENE GLYCOL 3350 17 G: 17 POWDER, FOR SOLUTION ORAL at 09:11

## 2021-11-17 RX ADMIN — LIDOCAINE 5% 1 PATCH: 700 PATCH TOPICAL at 05:11

## 2021-11-17 RX ADMIN — HYDROCODONE BITARTRATE AND ACETAMINOPHEN 1 TABLET: 7.5; 325 TABLET ORAL at 09:11

## 2021-11-17 RX ADMIN — ASPIRIN 81 MG CHEWABLE TABLET 81 MG: 81 TABLET CHEWABLE at 08:11

## 2021-11-17 NOTE — NURSING
Called [facility] again and gave report to Nurse Karthik.  Questions addressed.  Advised her that transport has already been requested and we are waiting on the to pick patient up.

## 2021-11-17 NOTE — PLAN OF CARE
11/17/21 1407   Post-Acute Status   Post-Acute Authorization Placement   Post-Acute Placement Status Set-up Complete/Auth obtained   Discharge Delays Orders Needed  (transportation order for N to [facility] stretcher auth)   Discharge Plan   Discharge Plan A Skilled Nursing Facility       AFRICA has called in a request to [facility] for auth for a stretcher; AFRICA has also requested a order from provider, TRACY Monroe for medical transport-stretcher which is needed to be sent to [facility] for review    DC pending medical transport orders and transportation set up      Update:  1419: transportation orders received and hard faxed to [phone] for approval    1505: Patsy sheehan nurse with N gave verbal auth to approve stretcher; then  auth # [id number] given by Nelson figueroa/ RUPERTO; ADT 30 placed at this time

## 2021-11-17 NOTE — PROGRESS NOTES
[facility]  Wound Care    Patient Name:  Marylu Fenton   MRN:  [id number]  Date: 11/17/2021  Diagnosis: Closed left hip fracture    History:     Past Medical History:   Diagnosis Date    DM type 2, controlled, with complication 5/4/2016    Essential hypertension 5/4/2016    GERD (gastroesophageal reflux disease)        Social History     Socioeconomic History    Marital status:    Tobacco Use    Smoking status: Never Smoker   Substance and Sexual Activity    Alcohol use: No    Drug use: No       Precautions:     Allergies as of 11/10/2021    (No Known Allergies)   89-year-old female admitted status post fall at home with displaced left femoral neck fracture.  Orthopedics was consulted patient underwent left hip hemiarthroplasty.PMH of ventral hernia, prior SBO, and OA of the knee, FERD, T2DM, HTN, who was transferring between walker and the toilet today when she had two falls.        [facility] Assessment Details/Treatment   Wound care consulted to see left leg blister near hip incision.   Large fluid filled bulla noted adjacent to hip incision. Feel this is a MARSI (medical adhesive related skin injury) due to surgical tape.  Area cleaned and prepped with skin prep. Silicone  Bordered foam dressing applied to intact blisters for protection. Bulla will probably rupture and drain , but wound protocol can be maintained once open.     11/17/21 1612   Pain/Comfort/Sleep   POSS (Pasero Opioid-Induced Sed Scale) 1 - Awake and alert   RASS (Jaquez Agitation-Sedation Scale) 0-->alert and calm   Pain Reassessment   Pain Rating Prior to Med Admin 7        Altered Skin Integrity 11/17/21 1633 anterior Greater trochanter Blister(s) Partial thickness tissue loss. Shallow open ulcer with a red or pink wound bed, without slough. Intact or Open/Ruptured Serum-filled blister.   Date First Assessed/Time First Assessed: 11/17/21 1633   Altered Skin Integrity Present on Admission: suspected hospital  acquired  Orientation: anterior  Location: Greater trochanter  Is this injury device related?: (c) Yes  Primary Wound Type: Bliste...   Wound Image    Description of Altered Skin Integrity Partial thickness tissue loss. Shallow open ulcer with a red or pink wound bed, without slough. Intact or Open/Ruptured Serum-filled blister.   Dressing Appearance Open to air;No dressing   Drainage Amount None   Drainage Characteristics/Odor No odor   Appearance Yellow;Blistered  (yellow gold fluid filled blister)   Tissue loss description Partial thickness   Periwound Area Intact   Wound Length (cm) 2 cm   Wound Width (cm) 5 cm   Wound Surface Area (cm^2) 10 cm^2   Care Cleansed with:;Sterile normal saline;Applied:;Skin Barrier   Dressing Applied;Silicone;Foam     11/17/2021

## 2021-11-17 NOTE — PLAN OF CARE
Call report sent to Deidra Shultz RN:    [facility]  Nursing [phone] Room 304 B  Nurse to report to: Ty    Transportation to be set up at this time

## 2021-11-17 NOTE — PLAN OF CARE
11/17/21 1224   Final Note   Assessment Type Final Discharge Note   Anticipated Discharge Disposition SNF   Post-Acute Status   Post-Acute Authorization Placement   Post-Acute Placement Status Set-up Complete/Auth obtained   Patient choice form signed by patient/caregiver List with quality metrics by geographic area provided;List from CMS Compare   Discharge Delays (!) Other  (call report info from [employer])   [facility]  Discharge Final Note    Primary Care Provider: Kathy Steele MD    Expected Discharge Date: 11/17/2021    DC to: [facility] for SNF    Plan of care orders and prescription for Norco uploaded and sent to SNF at this time; DC is pending call report from [employer]. Bere advised that POC orders and script as received; the ADON is reviewing, thereby she will contact  Sup later on a call report time and bed.     Transportation: to be set up once call report received.     Contact Info       Kathy Steele MD   Specialty: Internal Medicine   Relationship: PCP - [facility]...  [address] [place] [address]   Phone: [phone]       Next Steps: Follow up          Follow up w/ PCP to be est. Following SNF dc; SNF care management to arrange all post dc follow ups.

## 2021-11-17 NOTE — PLAN OF CARE
[facility]  Discharge Final Note    Primary Care Provider: Kathy Steele MD    Expected Discharge Date: 11/17/2021    Final Discharge Note (most recent)       Final Note - 11/17/21 1510          Final Note    Assessment Type Final Discharge Note (P)      Anticipated Discharge Disposition Skilled Nursing Facility (P)         Post-Acute Status    Post-Acute Authorization Placement (P)      Post-Acute Placement Status Set-up Complete/Auth obtained (P)      Discharge Delays Ambulance Transport/Facility Transport (P)                                   Contact Info       Kathy Steele MD   Specialty: Internal Medicine   Relationship: PCP - General    [facility]...  [address] [place] [address]   Phone: [phone]       Next Steps: Follow up            DC to [facility]  Room 304 B, Nurse TY  Call report given to Deidra Shultz  ADT 30 transport placed at this time for a stretcher ambulance   Family contact: notified daughter [phone] (Ms. Bobbi Yang)

## 2021-11-17 NOTE — PLAN OF CARE
Patient is resting in bed in no acute distress/shortness of breath. Patient is tolerating purwik well and has had minimal pain throughout shift. Patient is excited about being discharged from hospital today. All safety precautions are in place including call light within reach.

## 2021-11-17 NOTE — PLAN OF CARE
Patient/family will complete admission paperwork upon arrival to facility; Bedside RN Deidra and daughter Bobbi updated on status of discharge.

## 2021-11-18 ENCOUNTER — EXTERNAL HOSPITAL ADMISSION (OUTPATIENT)
Dept: SKILLED NURSING FACILITY | Facility: HOSPITAL | Age: 86
End: 2021-11-18
Payer: MEDICARE

## 2021-11-18 DIAGNOSIS — S72.002A CLOSED FRACTURE OF LEFT HIP, INITIAL ENCOUNTER: Primary | ICD-10-CM

## 2021-11-18 NOTE — PHYSICIAN QUERY
PT Name: Marylu Fenton  MR #: [id number]     DOCUMENTATION CLARIFICATION     CDS/: Ju Chaparro RN, CDS   Contact Information: [email or web address]  This form is a permanent document in the medical record.     Query Date: November 18, 2021    By submitting this query, we are merely seeking further clarification of documentation.  Please utilize your independent clinical judgment when addressing the question(s) below.    The Medical Record contains the following:   Indicators   Supporting Clinical Findings Location in Medical Record    Non-blanchable erythema/redness      Ulcer/Injury/Skin Breakdown      Deep Tissue Injury     x Wound care consult -Left leg blister near hip incision.   -Large fluid filled bulla noted adjacent to hip incision.  -Feel this is a MARSI (medical adhesive related skin injury) due to surgical tape.       Altered Skin Integrity 11/17/21 1633 anterior Greater trochanter Blister(s) Partial thickness tissue loss. Shallow open ulcer with a red or pink wound bed, without slough. Intact or Open/Ruptured Serum-filled blister.   Date First Assessed/Time First Assessed: 11/17/21 1633   Altered Skin Integrity Present on Admission: suspected hospital acquired  Orientation: anterior  Location: Greater trochanter  Is this injury device related?: (c) Yes  Primary Wound Type: Bliste...   Wound Image    Description of Altered Skin Integrity Partial thickness tissue loss. Shallow open ulcer with a red or pink wound bed, without slough. Intact or Open/Ruptured Serum-filled blister.   Dressing Appearance Open to air;No dressing   Drainage Amount None   Drainage Characteristics/Odor No odor   Appearance Yellow;Blistered  (yellow gold fluid filled blister)   Tissue loss description Partial thickness   Periwound Area Intact   Wound Length (cm) 2 cm   Wound Width (cm) 5 cm   Wound Surface Area (cm^2) 10 cm^2   Care Cleansed with:;Sterile normal saline;Applied:;Skin Barrier   Dressing  Applied;Silicone;Foam    Wound  Care 11/17   x Acute/Chronic Illness left hip fracture, DM2, HTN, HLD    Poor bone quality Hospital medicine discharge summary  OP note 11/11   x Medication/Treatment Area cleaned and prepped with skin prep. Silicone  Bordered foam dressing applied to intact blisters for protection. Bulla will probably rupture and drain , but wound protocol can be maintained once open. Wound care 111/7    Other       The clinical guidelines noted are only a system guideline. It does not replace the providers clinical judgment.    Per the National Pressure Injury Advisory Panel:   A pressure injury is localized damage to the skin and underlying soft tissue usually over a bony prominence or related to a medical or other device. The injury can present as intact skin or an open ulcer and may be painful. The injury occurs as a result of intense and/or prolonged pressure or pressure in combination with shear. The tolerance of soft tissue for pressure and shear may also be affected by microclimate, nutrition, perfusion, co-morbidities and condition of the soft tissue.       Stage 1 Pressure Injury:  Intact skin with a localized area of non-blanchable erythema, which may appear differently in darkly pigmented skin. Color changes do not include purple or maroon discoloration; these may indicate deep tissue pressure injury.    Stage 2 Pressure Injury:  Partial-thickness loss of skin with exposed dermis. The wound bed is viable, pink or red, moist, and may also present as an intact or ruptured serum-filled blister.    Stage 3 Pressure Injury:  Full-thickness loss of skin, in which adipose (fat) is visible in the ulcer and granulation tissue and epibole (rolled wound edges) are often present. Slough and/or eschar may be visible. Undermining and tunneling may occur.    Stage 4 Pressure Injury:  Full-thickness skin and tissue loss with exposed or directly palpable fascia, muscle, tendon, ligament, cartilage or  bone in the ulcer. Slough and/or eschar may be visible. Epibole (rolled edges), undermining and/or tunneling often occur.    Unstageable Pressure Injury:  Full-thickness skin and tissue loss in which the extent of tissue damage within the ulcer cannot be confirmed because it is obscured by slough or eschar. If slough or eschar is removed, a Stage 3 or Stage 4 pressure injury will be revealed.    Deep Tissue Pressure Injury:  Intact or non-intact skin with localized area of persistent non-blanchable deep red, maroon, purple discoloration or epidermal separation revealing a dark wound bed or blood filled blister. This injury results from intense and/or prolonged pressure and shear forces at the bone-muscle interface. The wound may evolve rapidly to reveal the actual extent of tissue injury, or may resolve without tissue loss. If necrotic tissue, subcutaneous tissue, granulation tissue, fascia, muscle or other underlying structures are visible, this indicates a full thickness pressure injury (Unstageable, Stage 3 or Stage 4). Do not use DTPI to describe vascular, traumatic, neuropathic, or dermatologic conditions.   Medical Device Related Pressure Injury: This describes an etiology. Medical device related pressure injuries result from the use of devices designed and applied for diagnostic or therapeutic purposes. The resultant pressure injury generally conforms to the pattern or shape of the device. The injury should be staged using the staging system.    Mucosal Membrane Pressure Injury: Mucosal membrane pressure injury is found on mucous membranes with a history of a medical device in use at the location of the injury. Due to the anatomy of the tissue these ulcers cannot be staged.       Provider, please provide the integumentary diagnosis related to the documentation of the                   ___Anterior Greater Trochanter____ :     [   ] Pressure Injury/Decubitus Ulcer, Stage 2     [   ] Pressure Injury/Decubitus  Ulcer, Unstageable     [   ] Medical Device Related Pressure Injury/Decubitus Ulcer, Stage 2     [   ] Medical Device Related Pressure Injury/Decubitus Ulcer, Unstageable     [   x] Other Integumentary Diagnosis (please specify):__MARSI (medical adhesive related skin injury) due to surgical tape.____________   [  ] Clinically Undetermined             Please document in your progress notes daily for the duration of treatment until resolved and include in your discharge summary.    Reference:    CULLEN Jewell., RA Michaud., Goldberg, M., CULLEN Elder, CULLEN Daniel, & MISHA Lira. (2016). Revised National Pressure Ulcer Advisory Panel Pressure Injury Staging System: Revised Pressure Injury Staging System. J Wound Ostomy Continence Nurs, 43(6), 585-597. doi:10.1097/won.8482163554529206    Form No.[id number]

## 2021-11-18 NOTE — PLAN OF CARE
Problem: Adult Inpatient Plan of Care  Goal: Plan of Care Review  Outcome: Ongoing, Progressing  Goal: Patient-Specific Goal (Individualization)  Outcome: Ongoing, Progressing  Goal: Absence of Hospital-Acquired Illness or Injury  Outcome: Ongoing, Progressing  Goal: Optimal Comfort and Wellbeing  Outcome: Ongoing, Progressing  Goal: Readiness for Transition of Care  Outcome: Ongoing, Progressing  Goal: Rounds/Family Conference  Outcome: Ongoing, Progressing     Problem: Diabetes Comorbidity  Goal: Blood Glucose Level Within Desired Range  Outcome: Ongoing, Progressing     Problem: Skin Injury Risk Increased  Goal: Skin Health and Integrity  Outcome: Ongoing, Progressing     Problem: Pain Acute  Goal: Optimal Pain Control  Outcome: Ongoing, Progressing     Problem: Fall Injury Risk  Goal: Absence of Fall and Fall-Related Injury  Outcome: Ongoing, Progressing     Problem: Infection  Goal: Infection Symptom Resolution  Outcome: Ongoing, Progressing     Problem: Wound  Goal: Optimal Wound Healing  Outcome: Ongoing, Progressing

## 2021-11-19 LAB
FINAL PATHOLOGIC DIAGNOSIS: NORMAL
Lab: NORMAL

## 2021-11-22 NOTE — PHYSICIAN QUERY
PT Name: Marylu Fenton  MR #: [id number]    DOCUMENTATION CLARIFICATION   CDS/: Ju Chaparro RN, CDS   Contact Information: [email or web address]    This form is a permanent document in the medical record.     Query Date: November 22, 2021    Dear Provider,  By submitting this query, we are merely seeking further clarification of documentation. Please utilize your independent clinical judgment when addressing the question(s) below.    The medical record contains the following:  Supporting Clinical Findings Location in Medical Record   -Fell at home sustaining displaced left femoral neck fracture.  Poor bone quality options discussed cemented endoprosthesis elected significant risk discussed  -Attention turned to the femur with a cookie cutter straight reaming and then broaching up to an 8 which showed excellent fixation no subsidence.    -While putting the neck angle on an reducing she sustained a fracture of the proximal femur just below the lesser trochanter. This was recognized and secured with a cable. Reduced with a cable and the 8 in place.  With the short 8 showed very stiff because of the stiffness in the knee.  But excellent stability and leg lengths equal which was tough to tell because of the deformity in the knee.  A 7 stem was then cemented using good cement technique.  Excess cement was removed.  Final reduction with a -646 again showed good stability leg lengths normal considering the knee deformity    BONE, LEFT FEMORAL HEAD, HEMIARTHROPLASTY:  -Synovium and soft tissue showing degenerative and reactive features.  -Bone with articular cartilage showing degenerative features, consistent with osteoarthritis    -Closed left hip fracture  Patient was transferring between walker and the toilet today when she had two falls. Her family was unable to lift her after the first fall and the Fire Department was called, but she fell again after they lifted her into her wheelchair as she tried to get  up to use the toilet.    -Since the previous examination there are now new postoperative changes from a total left hip arthroplasty.  Prosthesis is in satisfactory position.  No dislocations.  There is presence of air within the soft tissues related to recent surgery.  Surgical skin staples along the lateral aspect of the hip related to recent surgery.  There is extensive vascular calcifications from atherosclerosis.    Examination is limited by body habitus.  Two views of the left hip demonstrate an acute fracture of the left femoral neck with associated varus deformity.  Mild degenerative changes are seen at both hip joints.  Bones are diffusely osteopenic.  There are advanced vascular calcifications.   OP note 11/11                      Final pathology results from 11/11 [facility] 11/11        Xray 11/11          Xray 11/10       Please clarify if ____the fracture of the proximal femur just below the lesser trochanter.____ (as it relates to __the Left Hip hemiarthroplasty___) is:      [ x ] Complication of the procedure   [  ] Present, but not a complication of the procedure   [  ] Other (please specify): __________________   [  ] Clinically Undetermined       Please document in your progress notes daily for the duration of treatment until resolved and include in your discharge summary.

## 2021-11-22 NOTE — PHYSICIAN QUERY
Name: Marylu Fenton  MR #: [id number]    DOCUMENTATION CLARIFICATION     CDS/: Ju Chaparro RN, CDS   Contact Information: [email or web address]    This form is a permanent document in the medical record.    Query Date: November 22, 2021    By submitting this query, we are merely seeking further clarification of documentation. Please utilize your independent clinical judgment when addressing the question(s) below.    The Medical Record contains the following:   Indicators Supporting Clinical Findings Location in Medical Record   x Fracture documented -Closed left hip fracture  Patient was transferring between walker and the toilet today when she had two falls. Her family was unable to lift her after the first fall and the Fire Department was called, but she fell again after they lifted her into her wheelchair as she tried to get up to use the toilet.   Hospital Medicine 11/11   x Osteoporosis, malignancy documented -Fell at home sustaining displaced left femoral neck fracture.  Poor bone quality options discussed cemented endoprosthesis elected significant risk discussed  -Attention turned to the femur with a cookie cutter straight reaming and then broaching up to an 8 which showed excellent fixation no subsidence.    -While putting the neck angle on an reducing she sustained a fracture of the proximal femur just below the lesser trochanter. This was recognized and secured with a cable. Reduced with a cable and the 8 in place.  With the short 8 showed very stiff because of the stiffness in the knee.  But excellent stability and leg lengths equal which was tough to tell because of the deformity in the knee.  A 7 stem was then cemented using good cement technique. OP note 11/11   x Radiology Findings Since the previous examination there are now new postoperative changes from a total left hip arthroplasty.  Prosthesis is in satisfactory position.  No dislocations.  There is presence of air within the soft  tissues related to recent surgery.  Surgical skin staples along the lateral aspect of the hip related to recent surgery.  There is extensive vascular calcifications from atherosclerosis.     Examination is limited by body habitus.  Two views of the left hip demonstrate an acute fracture of the left femoral neck with associated varus deformity.  Mild degenerative changes are seen at both hip joints.  Bones are diffusely osteopenic.  There are advanced vascular calcifications. Xray 11/11                    Xray 11/10    Treatment/Medication     x Other BONE, LEFT FEMORAL HEAD, HEMIARTHROPLASTY:  -Synovium and soft tissue showing degenerative and reactive features.  -Bone with articular cartilage showing degenerative features, consistent with osteoarthritis.   Final pathology results from 11/11 collection     Provider, please clarify if there is any clinical correlation between _Poor bone quality_______ and __the Left femoral neck fracture______.    [ x  ] Due to or associated with each other   [   ] Unrelated to each other   [   ] Other explanation (please specify): _________   [  ] Clinically undetermined       Please document in your progress notes daily for the duration of treatment, until resolved, and include in your discharge summary.    Form No. [id number]

## 2021-11-23 ENCOUNTER — EXTERNAL HOSPITAL ADMISSION (OUTPATIENT)
Dept: SKILLED NURSING FACILITY | Facility: HOSPITAL | Age: 86
End: 2021-11-23
Payer: MEDICARE

## 2021-11-23 DIAGNOSIS — I10 ESSENTIAL HYPERTENSION: Primary | ICD-10-CM

## 2021-11-29 PROBLEM — K21.9 GERD (GASTROESOPHAGEAL REFLUX DISEASE): Status: ACTIVE | Noted: 2021-11-29

## 2021-11-29 PROBLEM — H40.9 GLAUCOMA: Status: ACTIVE | Noted: 2021-11-29

## 2021-11-30 ENCOUNTER — EXTERNAL HOSPITAL ADMISSION (OUTPATIENT)
Dept: SKILLED NURSING FACILITY | Facility: HOSPITAL | Age: 86
End: 2021-11-30
Payer: MEDICARE

## 2021-11-30 DIAGNOSIS — S72.002A CLOSED FRACTURE OF LEFT HIP, INITIAL ENCOUNTER: Primary | ICD-10-CM

## 2021-12-02 ENCOUNTER — EXTERNAL HOSPITAL ADMISSION (OUTPATIENT)
Dept: SKILLED NURSING FACILITY | Facility: HOSPITAL | Age: 86
End: 2021-12-02
Payer: MEDICARE

## 2021-12-02 DIAGNOSIS — S72.002A CLOSED FRACTURE OF LEFT HIP, INITIAL ENCOUNTER: Primary | ICD-10-CM

## 2021-12-07 ENCOUNTER — EXTERNAL HOSPITAL ADMISSION (OUTPATIENT)
Dept: SKILLED NURSING FACILITY | Facility: HOSPITAL | Age: 86
End: 2021-12-07
Payer: MEDICARE

## 2021-12-07 DIAGNOSIS — I10 HYPERTENSION, UNSPECIFIED TYPE: Primary | ICD-10-CM

## 2021-12-09 ENCOUNTER — EXTERNAL HOSPITAL ADMISSION (OUTPATIENT)
Dept: SKILLED NURSING FACILITY | Facility: HOSPITAL | Age: 86
End: 2021-12-09
Payer: MEDICARE

## 2021-12-09 DIAGNOSIS — S72.002A CLOSED FRACTURE OF LEFT HIP, INITIAL ENCOUNTER: Primary | ICD-10-CM

## 2021-12-14 ENCOUNTER — EXTERNAL HOSPITAL ADMISSION (OUTPATIENT)
Dept: SKILLED NURSING FACILITY | Facility: HOSPITAL | Age: 86
End: 2021-12-14
Payer: MEDICARE

## 2021-12-14 DIAGNOSIS — S72.002A CLOSED FRACTURE OF LEFT HIP, INITIAL ENCOUNTER: Primary | ICD-10-CM

## 2021-12-16 ENCOUNTER — EXTERNAL HOSPITAL ADMISSION (OUTPATIENT)
Dept: SKILLED NURSING FACILITY | Facility: HOSPITAL | Age: 86
End: 2021-12-16
Payer: MEDICARE

## 2021-12-16 DIAGNOSIS — I10 HYPERTENSION, UNSPECIFIED TYPE: Primary | ICD-10-CM

## 2021-12-28 PROBLEM — N39.0 E. COLI UTI: Status: ACTIVE | Noted: 2021-12-28

## 2021-12-28 PROBLEM — B96.20 E. COLI UTI: Status: ACTIVE | Noted: 2021-12-28

## 2021-12-29 ENCOUNTER — EXTERNAL HOSPITAL ADMISSION (OUTPATIENT)
Dept: SKILLED NURSING FACILITY | Facility: HOSPITAL | Age: 86
End: 2021-12-29
Payer: MEDICARE

## 2021-12-29 DIAGNOSIS — R53.81 DEBILITY: Primary | ICD-10-CM

## 2022-01-03 ENCOUNTER — HOSPITAL ENCOUNTER (EMERGENCY)
Facility: OTHER | Age: 87
Discharge: HOME | End: 2022-01-03
Attending: EMERGENCY MEDICINE
Payer: MEDICARE

## 2022-01-03 VITALS
TEMPERATURE: 98 F | SYSTOLIC BLOOD PRESSURE: 140 MMHG | DIASTOLIC BLOOD PRESSURE: 68 MMHG | HEART RATE: 92 BPM | RESPIRATION RATE: 19 BRPM | OXYGEN SATURATION: 97 %

## 2022-01-03 DIAGNOSIS — R55 SYNCOPE: Primary | ICD-10-CM

## 2022-01-03 LAB
ALBUMIN SERPL BCP-MCNC: 3.4 G/DL (ref 3.5–5.2)
ALP SERPL-CCNC: 114 U/L (ref 55–135)
ALT SERPL W/O P-5'-P-CCNC: 20 U/L (ref 10–44)
ANION GAP SERPL CALC-SCNC: 11 MMOL/L (ref 8–16)
AST SERPL-CCNC: 21 U/L (ref 10–40)
BASOPHILS # BLD AUTO: 0.03 K/UL (ref 0–0.2)
BASOPHILS NFR BLD: 0.2 % (ref 0–1.9)
BILIRUB SERPL-MCNC: 0.5 MG/DL (ref 0.1–1)
BILIRUB UR QL STRIP: NEGATIVE
BNP SERPL-MCNC: <10 PG/ML (ref 0–99)
BUN SERPL-MCNC: 21 MG/DL (ref 8–23)
CALCIUM SERPL-MCNC: 9.2 MG/DL (ref 8.7–10.5)
CHLORIDE SERPL-SCNC: 99 MMOL/L (ref 95–110)
CLARITY UR: CLEAR
CO2 SERPL-SCNC: 26 MMOL/L (ref 23–29)
COLOR UR: YELLOW
CREAT SERPL-MCNC: 0.9 MG/DL (ref 0.5–1.4)
CTP QC/QA: YES
DIFFERENTIAL METHOD: ABNORMAL
EOSINOPHIL # BLD AUTO: 0.1 K/UL (ref 0–0.5)
EOSINOPHIL NFR BLD: 0.4 % (ref 0–8)
ERYTHROCYTE [DISTWIDTH] IN BLOOD BY AUTOMATED COUNT: 15.4 % (ref 11.5–14.5)
EST. GFR  (AFRICAN AMERICAN): >60 ML/MIN/1.73 M^2
EST. GFR  (NON AFRICAN AMERICAN): 56 ML/MIN/1.73 M^2
GLUCOSE SERPL-MCNC: 167 MG/DL (ref 70–110)
GLUCOSE UR QL STRIP: NEGATIVE
HCT VFR BLD AUTO: 28.2 % (ref 37–48.5)
HGB BLD-MCNC: 8.6 G/DL (ref 12–16)
HGB UR QL STRIP: NEGATIVE
IMM GRANULOCYTES # BLD AUTO: 0.18 K/UL (ref 0–0.04)
IMM GRANULOCYTES NFR BLD AUTO: 1.3 % (ref 0–0.5)
KETONES UR QL STRIP: NEGATIVE
LEUKOCYTE ESTERASE UR QL STRIP: NEGATIVE
LYMPHOCYTES # BLD AUTO: 1.5 K/UL (ref 1–4.8)
LYMPHOCYTES NFR BLD: 10.8 % (ref 18–48)
MAGNESIUM SERPL-MCNC: 2 MG/DL (ref 1.6–2.6)
MCH RBC QN AUTO: 27.3 PG (ref 27–31)
MCHC RBC AUTO-ENTMCNC: 30.5 G/DL (ref 32–36)
MCV RBC AUTO: 90 FL (ref 82–98)
MONOCYTES # BLD AUTO: 0.8 K/UL (ref 0.3–1)
MONOCYTES NFR BLD: 5.8 % (ref 4–15)
NEUTROPHILS # BLD AUTO: 11.6 K/UL (ref 1.8–7.7)
NEUTROPHILS NFR BLD: 81.5 % (ref 38–73)
NITRITE UR QL STRIP: NEGATIVE
NRBC BLD-RTO: 0 /100 WBC
PH UR STRIP: 6 [PH] (ref 5–8)
PHOSPHATE SERPL-MCNC: 4.5 MG/DL (ref 2.7–4.5)
PLATELET # BLD AUTO: 387 K/UL (ref 150–450)
PMV BLD AUTO: 9.4 FL (ref 9.2–12.9)
POTASSIUM SERPL-SCNC: 4.4 MMOL/L (ref 3.5–5.1)
PROT SERPL-MCNC: 8 G/DL (ref 6–8.4)
PROT UR QL STRIP: NEGATIVE
RBC # BLD AUTO: 3.15 M/UL (ref 4–5.4)
SARS-COV-2 RDRP RESP QL NAA+PROBE: NEGATIVE
SODIUM SERPL-SCNC: 136 MMOL/L (ref 136–145)
SP GR UR STRIP: >=1.03 (ref 1–1.03)
URN SPEC COLLECT METH UR: ABNORMAL
UROBILINOGEN UR STRIP-ACNC: NEGATIVE EU/DL
WBC # BLD AUTO: 14.26 K/UL (ref 3.9–12.7)

## 2022-01-03 PROCEDURE — 93010 ELECTROCARDIOGRAM REPORT: CPT | Mod: ,,, | Performed by: INTERNAL MEDICINE

## 2022-01-03 PROCEDURE — 83735 ASSAY OF MAGNESIUM: CPT | Performed by: EMERGENCY MEDICINE

## 2022-01-03 PROCEDURE — 93005 ELECTROCARDIOGRAM TRACING: CPT

## 2022-01-03 PROCEDURE — 83880 ASSAY OF NATRIURETIC PEPTIDE: CPT | Performed by: EMERGENCY MEDICINE

## 2022-01-03 PROCEDURE — 93010 EKG 12-LEAD: ICD-10-PCS | Mod: ,,, | Performed by: INTERNAL MEDICINE

## 2022-01-03 PROCEDURE — 99284 EMERGENCY DEPT VISIT MOD MDM: CPT | Mod: 25

## 2022-01-03 PROCEDURE — 80053 COMPREHEN METABOLIC PANEL: CPT | Performed by: EMERGENCY MEDICINE

## 2022-01-03 PROCEDURE — 81003 URINALYSIS AUTO W/O SCOPE: CPT | Performed by: EMERGENCY MEDICINE

## 2022-01-03 PROCEDURE — U0002 COVID-19 LAB TEST NON-CDC: HCPCS | Performed by: EMERGENCY MEDICINE

## 2022-01-03 PROCEDURE — 85025 COMPLETE CBC W/AUTO DIFF WBC: CPT | Performed by: EMERGENCY MEDICINE

## 2022-01-03 PROCEDURE — 84100 ASSAY OF PHOSPHORUS: CPT | Performed by: EMERGENCY MEDICINE

## 2022-01-03 NOTE — ED PROVIDER NOTES
Encounter Date: 1/3/2022    SCRIBE #1 NOTE: ICaitlin, donita scribing for, and in the presence of, Lynsey Coleman MD.       History     Chief Complaint   Patient presents with    Loss of Consciousness     In wheelchair at nursing home, pt is awake and alert has no complaints. Nursing home staff state she was unresponsive in wheelchair for 2 minutes      This is a 89 y.o. female with history of DM, HTN, HLD, and GERD who presents via EMS with complaint of syncopal episode lasting about two minutes. She is a resident at [facility] since a recent orthopedic surgery and was found unconscious and difficult to arouse while seated in her wheelchair.  Patient states she is not sure if she passed out or simply fell asleep.  She denies any feelings of illness or lightheadedness preceding, except for intermittent rhinorrhea which is chronic in nature. She also reports left hip pain due to recent surgery about two weeks ago. Per EMS, her blood sugar was 230. No other exacerbating or alleviating factors. No other associated symptoms.    The history is provided by the patient and the EMS personnel.     Review of patient's allergies indicates:  No Known Allergies  Past Medical History:   Diagnosis Date    GERD (gastroesophageal reflux disease)     Glaucoma     History of small bowel obstruction     Hyperlipidemia     Hypertension     Iron deficiency anemia     Type II diabetes mellitus      Past Surgical History:   Procedure Laterality Date    CHOLECYSTECTOMY      EXPLORATORY LAPAROTOMY  04/14/2016    [facility]    HEMIARTHROPLASTY OF HIP Left 11/11/2021    Procedure: HEMIARTHROPLASTY, HIP;  Surgeon: Jose Quintana MD;  Location: [facility];  Service: Orthopedics;  Laterality: Left;    HYSTERECTOMY       Family History   Problem Relation Age of Onset    Cancer Mother      Social History     Tobacco Use    Smoking status: Never Smoker    Smokeless tobacco: Never Used   Substance Use Topics    Alcohol use:  No    Drug use: No     Review of Systems   Constitutional: Negative for chills and fever.   HENT: Positive for rhinorrhea (intermittent). Negative for congestion and sore throat.    Eyes: Negative for visual disturbance.   Respiratory: Negative for cough and shortness of breath.    Cardiovascular: Negative for chest pain and palpitations.   Gastrointestinal: Negative for abdominal pain, diarrhea and vomiting.   Genitourinary: Negative for decreased urine volume, dysuria and vaginal discharge.   Musculoskeletal: Negative for joint swelling, neck pain and neck stiffness.        Positive for left hip pain.   Skin: Negative for rash and wound.   Neurological: Positive for syncope. Negative for weakness, numbness and headaches.   Psychiatric/Behavioral: Negative for confusion.       Physical Exam     Initial Vitals   BP Pulse Resp Temp SpO2   01/03/22 1304 01/03/22 1304 01/03/22 1304 01/03/22 1304 01/03/22 1345   120/68 90 16 98.1 °F (36.7 °C) 100 %      MAP       --                Physical Exam    Nursing note and vitals reviewed.  Constitutional: She appears well-developed. She is not diaphoretic. No distress.   Elderly. Chronically ill appearing.   HENT:   Head: Normocephalic and atraumatic.   Eyes: EOM are normal.   Neck: Neck supple.   Normal range of motion.  Cardiovascular: Normal rate, regular rhythm and normal heart sounds. Exam reveals no gallop and no friction rub.    No murmur heard.  Pulmonary/Chest: Breath sounds normal. No respiratory distress. She has no wheezes. She has no rhonchi. She has no rales.   Abdominal: Abdomen is soft. There is no abdominal tenderness. There is no rebound and no guarding.   Musculoskeletal:         General: No tenderness or edema. Normal range of motion.      Cervical back: Normal range of motion and neck supple.     Neurological: She is alert and oriented to person, place, and time. She has normal strength.   Skin: Skin is warm and dry. No rash noted.   Psychiatric: She has a  normal mood and affect. Thought content normal.         ED Course   Procedures  Labs Reviewed   CBC W/ AUTO DIFFERENTIAL - Abnormal; Notable for the following components:       Result Value    WBC 14.26 (*)     RBC 3.15 (*)     Hemoglobin 8.6 (*)     Hematocrit 28.2 (*)     MCHC 30.5 (*)     RDW 15.4 (*)     Immature Granulocytes 1.3 (*)     Gran # (ANC) 11.6 (*)     Immature Grans (Abs) 0.18 (*)     Gran % 81.5 (*)     Lymph % 10.8 (*)     All other components within normal limits   COMPREHENSIVE METABOLIC PANEL - Abnormal; Notable for the following components:    Glucose 167 (*)     Albumin 3.4 (*)     eGFR if non  56 (*)     All other components within normal limits   URINALYSIS, REFLEX TO URINE CULTURE - Abnormal; Notable for the following components:    Specific Gravity, UA >=1.030 (*)     All other components within normal limits    Narrative:     Specimen Source->Urine   B-TYPE NATRIURETIC PEPTIDE   MAGNESIUM   PHOSPHORUS   SARS-COV-2 RDRP GENE     EKG Readings: (Independently Interpreted)   Normal sinus rhythm at rate of 93. No STEMI. Non specific T wave abnormalities similar to 11/10/21.     ECG Results          EKG 12-lead (In process)  Result time 01/03/22 16:08:16    In process by Interface, Lab In [facility] (01/03/22 16:08:16)                 Narrative:    Test Reason : R55,    Vent. Rate : 093 BPM     Atrial Rate : 093 BPM     P-R Int : 150 ms          QRS Dur : 090 ms      QT Int : 348 ms       P-R-T Axes : 000 028 147 degrees     QTc Int : 432 ms    Normal sinus rhythm  Cannot rule out Anterior infarct ,age undetermined  T wave abnormality, consider lateral ischemia  Abnormal ECG      Referred By: [email or web address]   SELF           Confirmed By:                             Imaging Results    None          Medications - No data to display  Medical Decision Making:   History:   Old Medical Records: I decided to obtain old medical records.  Clinical Tests:   Lab Tests: Ordered and Reviewed  ED  Management:  Emergent evaluation 89 old female who presents after an episode of possible syncope.  Patient states she thinks she may have just fallen asleep in her wheelchair and taken a moment to wake up.  Physical exam is benign, no evidence for acute CVA or serious infection noted.  EKG was similar to previous, shows no dysrhythmia or acute ischemic change, she had no dysrhythmia throughout ER course on telemetry.  Labs are benign with no evidence of renal insufficiency, major electrolyte disturbance, or UTI.  There is anemia present but it is stable and at baseline, no need for emergent transfusion.  Ultimately patient was discharged in good condition back to her nursing home, unclear if this truly represented syncope.          Scribe Attestation:   Scribe #1: I performed the above scribed service and the documentation accurately describes the services I performed. I attest to the accuracy of the note.                Physician Attestation for Scribe: I, Lynsey Coleman, reviewed documentation as scribed in my presence, which is both accurate and complete.    Clinical Impression:   Final diagnoses:  [R55] Syncope (Primary)          ED Disposition Condition    Discharge Stable        ED Prescriptions     None        Follow-up Information     Follow up With Specialties Details Why Contact Info    Kathy Blanco MD Internal Medicine Schedule an appointment as soon as possible for a visit   [address] [place] [id number] [phone]      [facility] - Emergency Dept Emergency Medicine  As needed, If symptoms worsen [address] [facility] [address] [phone]           Lynsey Coleman MD  01/10/22 5572

## 2022-01-03 NOTE — ED TRIAGE NOTES
Chief Complaint   Patient presents with    Loss of Consciousness     In wheelchair at nursing home, pt is awake and alert has no complaints. Nursing home staff state she was unresponsive in wheelchair for 2 minutes      Pt presents to the ED via EMS from nursing facility for evaluation of LOC.  Pt does not recall any LOC.  Pt alert and oriented to person, place and situation.  Per EMS, pt is at her baseline currently.  Pt queried; pt denies further complaints at this time.

## 2022-01-04 ENCOUNTER — EXTERNAL HOSPITAL ADMISSION (OUTPATIENT)
Dept: SKILLED NURSING FACILITY | Facility: HOSPITAL | Age: 87
End: 2022-01-04
Payer: MEDICARE

## 2022-01-04 DIAGNOSIS — R53.81 DEBILITY: Primary | ICD-10-CM

## 2022-01-06 ENCOUNTER — EXTERNAL HOSPITAL ADMISSION (OUTPATIENT)
Dept: SKILLED NURSING FACILITY | Facility: HOSPITAL | Age: 87
End: 2022-01-06
Payer: MEDICARE

## 2022-01-06 DIAGNOSIS — R53.81 DEBILITY: Primary | ICD-10-CM

## 2022-01-07 NOTE — PROGRESS NOTES
[facility]   Re-evaluation   DOS 1/4/2022     PRESENTING HISTORY     Chief Complaint/Reason for Admission: Evaluate skilled resident with debility and s/p left hip hemiarthroplasty and s/p ED visit for hypotension and syncope  PCP: Kathy Steele MD   Admission Date: 11/10/2021  Discharge Date and Time:  11/17/2021 5:42 PM    History of Present Illness:  Ms. Marylu Fenton is a 89 y.o. female who was admitted status post fall at home with displaced left femoral neck fracture.  She was transferring between walker and the toilet today when she had two falls. Her family was unable to lift her after the first fall and the Fire Department was called, but she fell again after they lifted her into her wheelchair as she tried to get up to use the toilet. After the second fall, she was unable to stand and bear weight and thus EMS was called. Upon EMS arrival her LLE was shortened and rotated upon EMS arrival. She also noted right sided neck pain. In the ED, imaging showed a left femoral neck fracture. Orthopedics was consulted patient underwent left hip hemiarthroplasty on 11/11/21.     ___________________________________________________________________    Today:  The patient  is currently laying in bed with no acute distress.  No complaints and no report of acute changes. LCTAB with no edema. She had a urinalysis drawn on 12/23/2021 which was relatively unremarkable urine culture grew E coli. She was started on macrobid on 12/27/21-1/4/22 per Dr. Camarillo. Denies dysuria, fever, chills.   She was sent to the ED on 1/3/22 for syncope and hypotension. In ED, NSR with no STEMI and no acute findings.   She can ambulate up to 15 ft with min assistance and she requires 2 person assist for transfers.     Review of Systems  General ROS: negative for chills, fever or weight loss  Psychological ROS: negative for hallucination, depression or suicidal ideation  Ophthalmic ROS: negative for  blurry vision, photophobia or eye pain  ENT ROS: negative for epistaxis, sore throat or rhinorrhea  Respiratory ROS: no cough, shortness of breath, or wheezing  Cardiovascular ROS: no chest pain or dyspnea on exertion  Gastrointestinal ROS: no abdominal pain, change in bowel habits, or black/ bloody stools  Genito-Urinary ROS: no dysuria, trouble voiding, or hematuria  Musculoskeletal ROS: +gait disturbance or muscular weakness  Neurological ROS: no syncope or seizures; no ataxia  Dermatological ROS: negative for pruritis, rash and jaundice      PAST HISTORY:     Past Medical History:   Diagnosis Date    GERD (gastroesophageal reflux disease)     Glaucoma     History of small bowel obstruction     Hyperlipidemia     Hypertension     Iron deficiency anemia     Type II diabetes mellitus        Past Surgical History:   Procedure Laterality Date    CHOLECYSTECTOMY      EXPLORATORY LAPAROTOMY  04/14/2016    SBO    HEMIARTHROPLASTY OF HIP Left 11/11/2021    Procedure: HEMIARTHROPLASTY, HIP;  Surgeon: Jose Quintana MD;  Location: [facility];  Service: Orthopedics;  Laterality: Left;    HYSTERECTOMY         Family History   Problem Relation Age of Onset    Cancer Mother          MEDICATIONS & ALLERGIES:     Current Outpatient Medications on File Prior to Visit   Medication Sig Dispense Refill    aspirin (ECOTRIN) 81 MG EC tablet Take 1 tablet (81 mg total) by mouth 2 (two) times a day.  0    atorvastatin (LIPITOR) 10 MG tablet Take 10 mg by mouth once daily.      CASTELLANI PAINT 1.5 % Liqd Apply topically to fungal infection between toes once daily.      diclofenac sodium 1 % Gel Apply topically 2 (two) times daily as needed. (for pain). Apply to both knees and big toes 100 g 0    dorzolamide (TRUSOPT) 2 % ophthalmic solution Place 1 drop into the right eye every morning.      ferrous sulfate 325 (65 FE) MG EC tablet Take 1 tablet (325 mg total) by mouth once daily.  0    HYDROcodone-acetaminophen  (NORCO) 7.5-325 mg per tablet Take 1 tablet by mouth every 6 (six) hours as needed for Pain. 15 tablet 0    latanoprost 0.005 % ophthalmic solution Place 1 drop into the right eye every evening.      multivitamin (THERAGRAN) tablet Take 1 tablet by mouth once daily.      polyethylene glycol (GLYCOLAX) 17 gram PwPk Take 17 g by mouth every other day.      senna-docusate 8.6-50 mg (PERICOLACE) 8.6-50 mg per tablet Take 1 tablet by mouth 2 (two) times daily.      timolol maleate 0.5% (TIMOPTIC) 0.5 % Drop Place 1 drop into the right eye once daily. 5 mL 1     No current facility-administered medications on file prior to visit.        Review of patient's allergies indicates:  No Known Allergies    OBJECTIVE:     Vital Signs:  /64, pulse93, resp 20, sp02 96%, temp 97.9    Physical Exam:  General appearance: alert, cooperative, no distress  Constitutional:Oriented to person, place, and time  + appears well-developed and well-nourished.    HEENT: Normocephalic, atraumatic, neck symmetrical, no nasal discharge   Eyes: conjunctivae/corneas clear, PERRL  Lungs: clear to auscultation bilaterally  Heart: regular rate and rhythm   Abdomen: soft, non-tender; bowel sounds normoactive  Extremities: extremities symmetric; no clubbing, cyanosis, or edema  Integument: Skin color, texture, turgor normal; no rashes; hair distrubution normal  Neurologic: Alert and oriented X 3, normal strength, normal coordination and gait  Psychiatric: no pressured speech; normal affect; no evidence of impaired cognition       ASSESSMENT & PLAN:     Closed left hip fracture  - left hip hemiarthroplasty on 11/11/21  - weight bearing as tolerated  - DVT prophylaxis with ASA 81 mg BID  - continued PT/OT    UTI with E. Coli  - on 12/27/21 she was started on macrobid 100 mg by mouth twice daily for 7 days, ends today   - no dysuria. Afebrile. VSS    Debility  - continue PT/OT SNF  -Continue WBAT to the left lower extremity,    Essential  hypertension  - normotensive  -  she no longer takes any blood pressure meds          Scheduled Follow-up :  No future appointments.    Post Visit Medication List:     Medication List          Accurate as of January 4, 2022 11:59 PM. If you have any questions, ask your nurse or doctor.            CONTINUE taking these medications    aspirin 81 MG EC tablet  Commonly known as: ECOTRIN  Take 1 tablet (81 mg total) by mouth 2 (two) times a day.     atorvastatin 10 MG tablet  Commonly known as: LIPITOR     CASTELLANI PAINT 1.5 % Liqd  Generic drug: phenoL     diclofenac sodium 1 % Gel  Commonly known as: VOLTAREN  Apply topically 2 (two) times daily as needed. (for pain). Apply to both knees and big toes     dorzolamide 2 % ophthalmic solution  Commonly known as: TRUSOPT     ferrous sulfate 325 (65 FE) MG EC tablet  Take 1 tablet (325 mg total) by mouth once daily.     HYDROcodone-acetaminophen 7.5-325 mg per tablet  Commonly known as: NORCO  Take 1 tablet by mouth every 6 (six) hours as needed for Pain.     latanoprost 0.005 % ophthalmic solution     multivitamin tablet  Commonly known as: THERAGRAN  Take 1 tablet by mouth once daily.     polyethylene glycol 17 gram Pwpk  Commonly known as: GLYCOLAX     senna-docusate 8.6-50 mg 8.6-50 mg per tablet  Commonly known as: PERICOLACE  Take 1 tablet by mouth 2 (two) times daily.            Signing Physician:  Alexandra Lieberman NP

## 2022-01-10 NOTE — PROGRESS NOTES
[facility]   Re-evaluation   DOS 1/6/2022     PRESENTING HISTORY     Chief Complaint/Reason for Admission: Evaluate skilled resident with debility s/p left hip hemiarthroplasty   PCP: Kathy Steele MD   Admission Date: 11/10/2021  Discharge Date and Time:  11/17/2021 5:42 PM    History of Present Illness:  Ms. Marylu Fenton is a 89 y.o. female who was admitted status post fall at home with displaced left femoral neck fracture.  She was transferring between walker and the toilet today when she had two falls. Her family was unable to lift her after the first fall and the Fire Department was called, but she fell again after they lifted her into her wheelchair as she tried to get up to use the toilet. After the second fall, she was unable to stand and bear weight and thus EMS was called. Upon EMS arrival her LLE was shortened and rotated upon EMS arrival. She also noted right sided neck pain. In the ED, imaging showed a left femoral neck fracture. Orthopedics was consulted patient underwent left hip hemiarthroplasty on 11/11/21.     ___________________________________________________________________    Today:  The patient  is currently laying in bed with no acute distress.  Appears comfortable. States she is doing fine. No complaints and no report of acute changes. LCTAB with no edema.  She completed macrobid on 1/4/22 for UTI with E. Coli.  Denies dysuria, fever, chills.   She can ambulate up to 15 ft with min assistance and she requires 2 person assist for transfers.     Review of Systems  General ROS: negative for chills, fever or weight loss  Psychological ROS: negative for hallucination, depression or suicidal ideation  Ophthalmic ROS: negative for blurry vision, photophobia or eye pain  ENT ROS: negative for epistaxis, sore throat or rhinorrhea  Respiratory ROS: no cough, shortness of breath, or wheezing  Cardiovascular ROS: no chest pain or dyspnea on  exertion  Gastrointestinal ROS: no abdominal pain, change in bowel habits, or black/ bloody stools  Genito-Urinary ROS: no dysuria, trouble voiding, or hematuria  Musculoskeletal ROS: +gait disturbance or muscular weakness  Neurological ROS: no syncope or seizures; no ataxia  Dermatological ROS: negative for pruritis, rash and jaundice      PAST HISTORY:     Past Medical History:   Diagnosis Date    GERD (gastroesophageal reflux disease)     Glaucoma     History of small bowel obstruction     Hyperlipidemia     Hypertension     Iron deficiency anemia     Type II diabetes mellitus        Past Surgical History:   Procedure Laterality Date    CHOLECYSTECTOMY      EXPLORATORY LAPAROTOMY  04/14/2016    SBO    HEMIARTHROPLASTY OF HIP Left 11/11/2021    Procedure: HEMIARTHROPLASTY, HIP;  Surgeon: Jose Quintana MD;  Location: [facility];  Service: Orthopedics;  Laterality: Left;    HYSTERECTOMY         Family History   Problem Relation Age of Onset    Cancer Mother          MEDICATIONS & ALLERGIES:     Current Outpatient Medications on File Prior to Visit   Medication Sig Dispense Refill    aspirin (ECOTRIN) 81 MG EC tablet Take 1 tablet (81 mg total) by mouth 2 (two) times a day.  0    atorvastatin (LIPITOR) 10 MG tablet Take 10 mg by mouth once daily.      CASTELLANI PAINT 1.5 % Liqd Apply topically to fungal infection between toes once daily.      diclofenac sodium 1 % Gel Apply topically 2 (two) times daily as needed. (for pain). Apply to both knees and big toes 100 g 0    dorzolamide (TRUSOPT) 2 % ophthalmic solution Place 1 drop into the right eye every morning.      ferrous sulfate 325 (65 FE) MG EC tablet Take 1 tablet (325 mg total) by mouth once daily.  0    HYDROcodone-acetaminophen (NORCO) 7.5-325 mg per tablet Take 1 tablet by mouth every 6 (six) hours as needed for Pain. 15 tablet 0    latanoprost 0.005 % ophthalmic solution Place 1 drop into the right eye every evening.       multivitamin (THERAGRAN) tablet Take 1 tablet by mouth once daily.      polyethylene glycol (GLYCOLAX) 17 gram PwPk Take 17 g by mouth every other day.      senna-docusate 8.6-50 mg (PERICOLACE) 8.6-50 mg per tablet Take 1 tablet by mouth 2 (two) times daily.      timolol maleate 0.5% (TIMOPTIC) 0.5 % Drop Place 1 drop into the right eye once daily. 5 mL 1     No current facility-administered medications on file prior to visit.        Review of patient's allergies indicates:  No Known Allergies    OBJECTIVE:     Vital Signs:  /60, pulse 74, resp 20, sp02 97%, temp 98.1    Physical Exam:  General appearance: alert, cooperative, no distress  Constitutional:Oriented to person, place, and time  + appears well-developed and well-nourished.    HEENT: Normocephalic, atraumatic, neck symmetrical, no nasal discharge   Eyes: conjunctivae/corneas clear, PERRL  Lungs: clear to auscultation bilaterally  Heart: regular rate and rhythm   Abdomen: soft, non-tender; bowel sounds normoactive  Extremities: extremities symmetric; no clubbing, cyanosis, or edema  Integument: Skin color, texture, turgor normal; no rashes; hair distrubution normal  Neurologic: Alert and oriented X 3, normal strength, normal coordination and gait  Psychiatric: no pressured speech; normal affect; no evidence of impaired cognition       ASSESSMENT & PLAN:     Closed left hip fracture  - left hip hemiarthroplasty on 11/11/21  - weight bearing as tolerated  - DVT prophylaxis with ASA 81 mg BID  - continued PT/OT    Debility  - continue PT/OT SNF  -Continue WBAT to the left lower extremity,    Essential hypertension  - normotensive  -  she no longer takes any blood pressure meds          Scheduled Follow-up :  No future appointments.    Post Visit Medication List:     Medication List          Accurate as of January 6, 2022 11:59 PM. If you have any questions, ask your nurse or doctor.            CONTINUE taking these medications    aspirin 81 MG EC  tablet  Commonly known as: ECOTRIN  Take 1 tablet (81 mg total) by mouth 2 (two) times a day.     atorvastatin 10 MG tablet  Commonly known as: LIPITOR     CASTELLANI PAINT 1.5 % Liqd  Generic drug: phenoL     diclofenac sodium 1 % Gel  Commonly known as: VOLTAREN  Apply topically 2 (two) times daily as needed. (for pain). Apply to both knees and big toes     dorzolamide 2 % ophthalmic solution  Commonly known as: TRUSOPT     ferrous sulfate 325 (65 FE) MG EC tablet  Take 1 tablet (325 mg total) by mouth once daily.     HYDROcodone-acetaminophen 7.5-325 mg per tablet  Commonly known as: NORCO  Take 1 tablet by mouth every 6 (six) hours as needed for Pain.     latanoprost 0.005 % ophthalmic solution     multivitamin tablet  Commonly known as: THERAGRAN  Take 1 tablet by mouth once daily.     polyethylene glycol 17 gram Pwpk  Commonly known as: GLYCOLAX     senna-docusate 8.6-50 mg 8.6-50 mg per tablet  Commonly known as: PERICOLACE  Take 1 tablet by mouth 2 (two) times daily.            Signing Physician:  Alexandra Lieberman NP

## 2022-01-11 ENCOUNTER — EXTERNAL HOSPITAL ADMISSION (OUTPATIENT)
Dept: SKILLED NURSING FACILITY | Facility: HOSPITAL | Age: 87
End: 2022-01-11
Payer: MEDICARE

## 2022-01-11 DIAGNOSIS — R53.81 DEBILITY: Primary | ICD-10-CM

## 2022-01-13 ENCOUNTER — EXTERNAL HOSPITAL ADMISSION (OUTPATIENT)
Dept: SKILLED NURSING FACILITY | Facility: HOSPITAL | Age: 87
End: 2022-01-13
Payer: MEDICARE

## 2022-01-13 DIAGNOSIS — R53.81 DEBILITY: Primary | ICD-10-CM

## 2022-01-17 NOTE — PROGRESS NOTES
[facility]   Discharge Summary   DOS 1/13/2022     PRESENTING HISTORY     Chief Complaint/Reason for Admission: Discharge Note   PCP: Kathy Steele MD   Admission Date: 11/10/2021  Discharge Date and Time:  11/17/2021 5:42 PM    History of Present Illness:  Ms. Marylu Fenton is a 89 y.o. female who was admitted status post fall at home with displaced left femoral neck fracture.  She was transferring between walker and the toilet today when she had two falls. Her family was unable to lift her after the first fall and the Fire Department was called, but she fell again after they lifted her into her wheelchair as she tried to get up to use the toilet. After the second fall, she was unable to stand and bear weight and thus EMS was called. Upon EMS arrival her LLE was shortened and rotated upon EMS arrival. She also noted right sided neck pain. In the ED, imaging showed a left femoral neck fracture. Orthopedics was consulted patient underwent left hip hemiarthroplasty on 11/11/21.     ___________________________________________________________________    Today:  The patient is discharging home today. She is currently laying in bed with no acute distress.  No complaints and no report of acute changes. LCTAB with no edema. VSS. Afebrile.       Review of Systems  General ROS: negative for chills, fever or weight loss  Psychological ROS: negative for hallucination, depression or suicidal ideation  Ophthalmic ROS: negative for blurry vision, photophobia or eye pain  ENT ROS: negative for epistaxis, sore throat or rhinorrhea  Respiratory ROS: no cough, shortness of breath, or wheezing  Cardiovascular ROS: no chest pain or dyspnea on exertion  Gastrointestinal ROS: no abdominal pain, change in bowel habits, or black/ bloody stools  Genito-Urinary ROS: no dysuria, trouble voiding, or hematuria  Musculoskeletal ROS: +gait disturbance or muscular weakness  Neurological ROS: no syncope  or seizures; no ataxia  Dermatological ROS: negative for pruritis, rash and jaundice      PAST HISTORY:     Past Medical History:   Diagnosis Date    GERD (gastroesophageal reflux disease)     Glaucoma     History of small bowel obstruction     Hyperlipidemia     Hypertension     Iron deficiency anemia     Type II diabetes mellitus        Past Surgical History:   Procedure Laterality Date    CHOLECYSTECTOMY      EXPLORATORY LAPAROTOMY  04/14/2016    SBO    HEMIARTHROPLASTY OF HIP Left 11/11/2021    Procedure: HEMIARTHROPLASTY, HIP;  Surgeon: Jose Quintana MD;  [facility];  Service: Orthopedics;  Laterality: Left;    HYSTERECTOMY         Family History   Problem Relation Age of Onset    Cancer Mother          MEDICATIONS & ALLERGIES:     Current Outpatient Medications on File Prior to Visit   Medication Sig Dispense Refill    aspirin (ECOTRIN) 81 MG EC tablet Take 1 tablet (81 mg total) by mouth 2 (two) times a day.  0    atorvastatin (LIPITOR) 10 MG tablet Take 10 mg by mouth once daily.      CASTELLANI PAINT 1.5 % Liqd Apply topically to fungal infection between toes once daily.      diclofenac sodium 1 % Gel Apply topically 2 (two) times daily as needed. (for pain). Apply to both knees and big toes 100 g 0    dorzolamide (TRUSOPT) 2 % ophthalmic solution Place 1 drop into the right eye every morning.      ferrous sulfate 325 (65 FE) MG EC tablet Take 1 tablet (325 mg total) by mouth once daily.  0    HYDROcodone-acetaminophen (NORCO) 7.5-325 mg per tablet Take 1 tablet by mouth every 6 (six) hours as needed for Pain. 15 tablet 0    latanoprost 0.005 % ophthalmic solution Place 1 drop into the right eye every evening.      multivitamin (THERAGRAN) tablet Take 1 tablet by mouth once daily.      polyethylene glycol (GLYCOLAX) 17 gram PwPk Take 17 g by mouth every other day.      senna-docusate 8.6-50 mg (PERICOLACE) 8.6-50 mg per tablet Take 1 tablet by mouth 2 (two) times daily.       timolol maleate 0.5% (TIMOPTIC) 0.5 % Drop Place 1 drop into the right eye once daily. 5 mL 1     No current facility-administered medications on file prior to visit.        Review of patient's allergies indicates:  No Known Allergies    OBJECTIVE:     Vital Signs:  /65, pulse 72, resp 20, sp02 97%, temp 97.3    Physical Exam:  General appearance: alert, cooperative, no distress  Constitutional:Oriented to person, place, and time  + appears well-developed and well-nourished.    HEENT: Normocephalic, atraumatic, neck symmetrical, no nasal discharge   Eyes: conjunctivae/corneas clear, PERRL  Lungs: clear to auscultation bilaterally  Heart: regular rate and rhythm   Abdomen: soft, non-tender; bowel sounds normoactive  Extremities: extremities symmetric; no clubbing, cyanosis, or edema  Integument: Skin color, texture, turgor normal; no rashes; hair distrubution normal  Neurologic: Alert and oriented X 3, normal strength, normal coordination and gait  Psychiatric: no pressured speech; normal affect; no evidence of impaired cognition       ASSESSMENT & PLAN:     Closed left hip fracture  - left hip hemiarthroplasty on 11/11/21  - weight bearing as tolerated  - DVT prophylaxis with ASA 81 mg BID  - d/c home today with home health services    Essential hypertension  - normotensive  -  she no longer takes any blood pressure meds          DM II (diabetes mellitus, type II), controlled  DM with HLD  - last A1C: 5.4  - diet controlled  - on statin         Scheduled Follow-up :  No future appointments.    Post Visit Medication List:     Medication List          Accurate as of January 13, 2022 11:59 PM. If you have any questions, ask your nurse or doctor.            CONTINUE taking these medications    aspirin 81 MG EC tablet  Commonly known as: ECOTRIN  Take 1 tablet (81 mg total) by mouth 2 (two) times a day.     atorvastatin 10 MG tablet  Commonly known as: LIPITOR     CASTELLANI PAINT 1.5 % Liqd  Generic drug:  phenoL     diclofenac sodium 1 % Gel  Commonly known as: VOLTAREN  Apply topically 2 (two) times daily as needed. (for pain). Apply to both knees and big toes     dorzolamide 2 % ophthalmic solution  Commonly known as: TRUSOPT     ferrous sulfate 325 (65 FE) MG EC tablet  Take 1 tablet (325 mg total) by mouth once daily.     HYDROcodone-acetaminophen 7.5-325 mg per tablet  Commonly known as: NORCO  Take 1 tablet by mouth every 6 (six) hours as needed for Pain.     latanoprost 0.005 % ophthalmic solution     multivitamin tablet  Commonly known as: THERAGRAN  Take 1 tablet by mouth once daily.     polyethylene glycol 17 gram Pwpk  Commonly known as: GLYCOLAX     senna-docusate 8.6-50 mg 8.6-50 mg per tablet  Commonly known as: PERICOLACE  Take 1 tablet by mouth 2 (two) times daily.          Total time of the visit 36 min     Signing Physician:  Alexandra Lieberman NP

## 2022-01-17 NOTE — PROGRESS NOTES
[facility]   Re-evaluation   DOS 1/11/2022     PRESENTING HISTORY     Chief Complaint/Reason for Admission: Evaluate skilled resident with debility s/p left hip hemiarthroplasty   PCP: Kathy Steele MD   Admission Date: 11/10/2021  Discharge Date and Time:  11/17/2021 5:42 PM    History of Present Illness:  Ms. Marylu Fenton is a 89 y.o. female who was admitted status post fall at home with displaced left femoral neck fracture.  She was transferring between walker and the toilet today when she had two falls. Her family was unable to lift her after the first fall and the Fire Department was called, but she fell again after they lifted her into her wheelchair as she tried to get up to use the toilet. After the second fall, she was unable to stand and bear weight and thus EMS was called. Upon EMS arrival her LLE was shortened and rotated upon EMS arrival. She also noted right sided neck pain. In the ED, imaging showed a left femoral neck fracture. Orthopedics was consulted patient underwent left hip hemiarthroplasty on 11/11/21.     ___________________________________________________________________    Today:  The patient  is currently laying in bed with no acute distress.  She is scheduled for discharge home on 1/13/2022 with home health services. No complaints and no report of acute changes. LCTAB with no edema.  She completed macrobid on 1/4/22 for UTI with E. Coli.  Denies dysuria, fever, chills.   She can ambulate up to 15 ft with min assistance and she requires 2 person assist for transfers.     Review of Systems  General ROS: negative for chills, fever or weight loss  Psychological ROS: negative for hallucination, depression or suicidal ideation  Ophthalmic ROS: negative for blurry vision, photophobia or eye pain  ENT ROS: negative for epistaxis, sore throat or rhinorrhea  Respiratory ROS: no cough, shortness of breath, or wheezing  Cardiovascular ROS: no chest  pain or dyspnea on exertion  Gastrointestinal ROS: no abdominal pain, change in bowel habits, or black/ bloody stools  Genito-Urinary ROS: no dysuria, trouble voiding, or hematuria  Musculoskeletal ROS: +gait disturbance or muscular weakness  Neurological ROS: no syncope or seizures; no ataxia  Dermatological ROS: negative for pruritis, rash and jaundice      PAST HISTORY:     Past Medical History:   Diagnosis Date    GERD (gastroesophageal reflux disease)     Glaucoma     History of small bowel obstruction     Hyperlipidemia     Hypertension     Iron deficiency anemia     Type II diabetes mellitus        Past Surgical History:   Procedure Laterality Date    CHOLECYSTECTOMY      EXPLORATORY LAPAROTOMY  04/14/2016    SBO    HEMIARTHROPLASTY OF HIP Left 11/11/2021    Procedure: HEMIARTHROPLASTY, HIP;  Surgeon: Jose Quintana MD;  Location: [facility];  Service: Orthopedics;  Laterality: Left;    HYSTERECTOMY         Family History   Problem Relation Age of Onset    Cancer Mother          MEDICATIONS & ALLERGIES:     Current Outpatient Medications on File Prior to Visit   Medication Sig Dispense Refill    aspirin (ECOTRIN) 81 MG EC tablet Take 1 tablet (81 mg total) by mouth 2 (two) times a day.  0    atorvastatin (LIPITOR) 10 MG tablet Take 10 mg by mouth once daily.      CASTELLANI PAINT 1.5 % Liqd Apply topically to fungal infection between toes once daily.      diclofenac sodium 1 % Gel Apply topically 2 (two) times daily as needed. (for pain). Apply to both knees and big toes 100 g 0    dorzolamide (TRUSOPT) 2 % ophthalmic solution Place 1 drop into the right eye every morning.      ferrous sulfate 325 (65 FE) MG EC tablet Take 1 tablet (325 mg total) by mouth once daily.  0    HYDROcodone-acetaminophen (NORCO) 7.5-325 mg per tablet Take 1 tablet by mouth every 6 (six) hours as needed for Pain. 15 tablet 0    latanoprost 0.005 % ophthalmic solution Place 1 drop into the right eye every  evening.      multivitamin (THERAGRAN) tablet Take 1 tablet by mouth once daily.      polyethylene glycol (GLYCOLAX) 17 gram PwPk Take 17 g by mouth every other day.      senna-docusate 8.6-50 mg (PERICOLACE) 8.6-50 mg per tablet Take 1 tablet by mouth 2 (two) times daily.      timolol maleate 0.5% (TIMOPTIC) 0.5 % Drop Place 1 drop into the right eye once daily. 5 mL 1     No current facility-administered medications on file prior to visit.        Review of patient's allergies indicates:  No Known Allergies    OBJECTIVE:     Vital Signs:  /60, pulse 74, resp 20, sp02 97%, temp 98.1    Physical Exam:  General appearance: alert, cooperative, no distress  Constitutional:Oriented to person, place, and time  + appears well-developed and well-nourished.    HEENT: Normocephalic, atraumatic, neck symmetrical, no nasal discharge   Eyes: conjunctivae/corneas clear, PERRL  Lungs: clear to auscultation bilaterally  Heart: regular rate and rhythm   Abdomen: soft, non-tender; bowel sounds normoactive  Extremities: extremities symmetric; no clubbing, cyanosis, or edema  Integument: Skin color, texture, turgor normal; no rashes; hair distrubution normal  Neurologic: Alert and oriented X 3, normal strength, normal coordination and gait  Psychiatric: no pressured speech; normal affect; no evidence of impaired cognition       ASSESSMENT & PLAN:     Closed left hip fracture  - left hip hemiarthroplasty on 11/11/21  - weight bearing as tolerated  - DVT prophylaxis with ASA 81 mg BID  - continued PT/OT    Debility  - continue PT/OT SNF  -Continue WBAT to the left lower extremity,    Essential hypertension  - normotensive  -  she no longer takes any blood pressure meds          Scheduled Follow-up :  No future appointments.    Post Visit Medication List:     Medication List          Accurate as of January 11, 2022 11:59 PM. If you have any questions, ask your nurse or doctor.            CONTINUE taking these medications     aspirin 81 MG EC tablet  Commonly known as: ECOTRIN  Take 1 tablet (81 mg total) by mouth 2 (two) times a day.     atorvastatin 10 MG tablet  Commonly known as: LIPITOR     CASTELLANI PAINT 1.5 % Liqd  Generic drug: phenoL     diclofenac sodium 1 % Gel  Commonly known as: VOLTAREN  Apply topically 2 (two) times daily as needed. (for pain). Apply to both knees and big toes     dorzolamide 2 % ophthalmic solution  Commonly known as: TRUSOPT     ferrous sulfate 325 (65 FE) MG EC tablet  Take 1 tablet (325 mg total) by mouth once daily.     HYDROcodone-acetaminophen 7.5-325 mg per tablet  Commonly known as: NORCO  Take 1 tablet by mouth every 6 (six) hours as needed for Pain.     latanoprost 0.005 % ophthalmic solution     multivitamin tablet  Commonly known as: THERAGRAN  Take 1 tablet by mouth once daily.     polyethylene glycol 17 gram Pwpk  Commonly known as: GLYCOLAX     senna-docusate 8.6-50 mg 8.6-50 mg per tablet  Commonly known as: PERICOLACE  Take 1 tablet by mouth 2 (two) times daily.            Signing Physician:  Alexandra Lieberman NP

## 2022-08-02 NOTE — DISCHARGE SUMMARY
"[facility]  Hospital Medicine  Discharge Summary      Patient Name: Marylu Fenton  MRN: [id number]  Patient Class: IP- Inpatient  Admission Date: 11/10/2021  Hospital Length of Stay: 6 days  Discharge Date and Time:  11/17/2021 5:42 PM  Attending Physician: Yesica Herndon MD   Discharging Provider: Mabel Huber PA-C  Primary Care Provider: Kathy Steele MD      HPI:   TRACY Wei:  "Ms. Marylu Fenton is a 89 y.o. female, with PMH of ventral hernia, prior SBO, and OA of the knee, FERD, T2DM, HTN, who was transferring between walker and the toilet today when she had two falls. Her familt was unable to lift her after the first fall and the Fire Department was called, but she fell again after they lifted her into her wheelchair as she tried to get up to use the toilet. After the second fall, she was unable to stand and bear weight and thus EMS was called. Upon EMS arrival her LLE was shortened and rotated upon EMS arrival. She also noted right sided neck pain. She denied head injury or LOC during the falls, and denied light headedness. In the ED, imaging showed a left femoral neck fracture. Per Ortho she is to be NPO at [facility] for surgical fixation Thursday or Friday. She is placed on OBS. "      Procedure(s) (LRB):  HEMIARTHROPLASTY, HIP (Left)      Hospital Course:   89-year-old female admitted status post fall at home with displaced left femoral neck fracture.  Orthopedics was consulted patient underwent left hip hemiarthroplasty.        * Closed left hip fracture  - left hip hemiarthroplasty on 11/11/21  - weight bearing as tolerated  - DVT prophylaxis with ASA 81 mg BID  - patient discharged to SNF for continued PT/OT         Goals of Care Treatment Preferences:  Code Status: Full Code      Consults:   Consults (From admission, onward)        Status Ordering Provider     Inpatient consult to Social Work/Case Management  Once        Provider:  (Not yet assigned)    " 8/2/2022    Roshan Sandoval MD  [facility] [address] [facility] [place] [address]    RE: Dilip Frye       Dear Colleague,     I had the pleasure of seeing Dilip Frye in the [facility].          Assessment/Recommendations   History and physical for coronary angiogram with possible percutaneous coronary intervention    Assessment/Plan:  1.  Coronary artery disease: Patient was hospitalized from July 24 through July 27 with chest pain secondary to non-STEMI.  Coronary angiogram on 7/25/2022 showed 100% chronic occlusion in the right coronary artery.  Echocardiogram showed mild hypokinesis in inferolateral wall.  Cardiac MRI showed high probability of viability in the territory of RCA.  Patient was recommended staged PCI for chronic total occlusion of RCA with Dr. Haro.  Per Dr. Haro, if patient prefers he can see patient as an outpatient for consultation and then schedule procedure.This was discussed with patient and his wife today.  He feels comfortable and would like to proceed with staged PCI directly with Dr. Haro.    He is currently on aspirin, atorvastatin, metoprolol, and isosorbide mononitrate.  He is also on as needed nitroglycerin.  He is experiencing mild headache during the night as he has been taking isosorbide at night.    Discussed about the indication for coronary angiogram/possible PCI and the risks associated with angiogram including <0.1% of MI and CVA or death or any of the following to the degree that it could threaten her life: allergic reaction, arrhythmia, renal failure, hemorrhage, thrombosis and infection.  Risk associated with therapeutic intervention includes 2% or less risk of MI, less than 1% risk of CVA, emergency her surgery, death, less than 4% risk of vascular injury requiring surgical repair or blood transfusion with 20-30% risk of restenosis with a bare-metal stent and less than 10% risk of stenosis with a drug-eluting stent.      Recent lab work including BMP and CBC were unremarkable.   Patient  met with FUAD Pozo  for pre-angio teach.              2.  Dyslipidemia: Dilip Frye is on high intensity statin therapy with 40 mg daily. His most recent LDL is 119 and triglycerides 177 in July 2022.  Most recent AST/ALT are stable.    3.  Hypertension: His blood pressure is well controlled-he is on lisinopril 10 mg daily, metoprolol succinate 25 mg daily, and isosorbide.  His PCP told him that he can cut back on lisinopril from 10 mg to 5 mg if needed for symptomatic low blood pressure.    4.  Sinus bradycardia: Heart rate today stable in 70s.  He reports his heart rates been stable in 60s at home.    Plan:  - Continue ASA,  Imdur, and PRN nitroglycerin  -Given direction on how to use as needed nitroglycerin.  -Instructed to take isosorbide in the morning.  -Okay to take Tylenol if needed for headache  -Instructed to avoid NSAIDs given cardiovascular disease and risk of bleeding  - Avoid strenuous exercise or lifting heavy objects until further direction  - Please seek medical attention if persistent worsening chest pain/tightness/pressure, shortness of breath, lightheaded or dizziness  -Continue statin therapy  -Continue current hypertension regimen.  Patient will monitor his blood pressure and heart rate and will contact us or PCP if having issue with low blood pressure and low heart rate.  -Covid test as per protocol    He is going to cancel his cardiac rehab appointment until he completes his staged PCI which I think is reasonable.     History of Present Illness/Subjective    Dilip Frye is a 70 year old years old with recent diagnosis of coronary artery disease with known chronic total occlusion of RCA, hypertension, dyslipidemia, sinus bradycardia, GERD, and hyponatremia who is seen at [facility] for coronary angiogram/posthospitalization follow-up.    Patient was hospitalized from July 24 through July 27  Acknowledged EMERALD ATKINS     Inpatient consult to Social Work  Once        Provider:  (Not yet assigned)    Completed CARMELA GOMEZ     Inpatient consult to Skilled Nursing  Once        Provider:  (Not yet assigned)    Completed KOURTNEY MON     Inpatient consult to Orthopedic Surgery  Once        Provider:  Humberto Franklin MD    Completed KOURTNEY MON          No new Assessment & Plan notes have been filed under this hospital service since the last note was generated.  Service: Hospital Medicine    Final Active Diagnoses:    Diagnosis Date Noted POA    PRINCIPAL PROBLEM:  Closed left hip fracture [S72.002A] 11/11/2021 Yes    DM II (diabetes mellitus, type II), controlled [E11.9] 05/04/2016 Yes    Hyperlipidemia [E78.5] 05/04/2016 Yes    Essential hypertension [I10] 05/04/2016 Yes      Problems Resolved During this Admission:       Discharged Condition: good    Disposition: Skilled Nursing Facility    Follow Up:   Follow-up Information     Kathy Steele MD.    Specialty: Internal Medicine  Contact information:  [address] [place] [address] [phone]                       Patient Instructions:      Diet diabetic     Notify your health care provider if you experience any of the following:  temperature >100.4     Notify your health care provider if you experience any of the following:  increased confusion or weakness     Notify your health care provider if you experience any of the following:  redness, tenderness, or signs of infection (pain, swelling, redness, odor or green/yellow discharge around incision site)     Notify your health care provider if you experience any of the following:  severe uncontrolled pain     Activity as tolerated       Significant Diagnostic Studies: Labs:   BMP:   Recent Labs   Lab 11/16/21 0522   *      K 4.8      CO2 25   BUN 26*   CREATININE 0.7   CALCIUM 8.2*   , CBC   Recent Labs   Lab 11/16/21 0522   WBC 10.57    HGB 8.1*   HCT 25.4*       and All labs within the past 24 hours have been reviewed    Pending Diagnostic Studies:     Procedure Component Value Units Date/Time    Specimen to Pathology, Surgery Orthopedics [[id number]] Collected: 11/11/21 1242    Order Status: Sent Lab Status: In process Updated: 11/12/21 7203         Medications:  Reconciled Home Medications:      Medication List      CHANGE how you take these medications    aspirin 81 MG EC tablet  Commonly known as: ECOTRIN  Take 1 tablet (81 mg total) by mouth 2 (two) times a day.  What changed: when to take this        CONTINUE taking these medications    atorvastatin 10 MG tablet  Commonly known as: LIPITOR  Take 10 mg by mouth once daily.     CASTELLANI PAINT 1.5 % Liqd  Generic drug: phenoL  Apply topically to fungal infection between toes once daily.     diclofenac sodium 1 % Gel  Commonly known as: VOLTAREN  Apply topically 2 (two) times daily as needed. (for pain). Apply to both knees and big toes     dorzolamide 2 % ophthalmic solution  Commonly known as: TRUSOPT  Place 1 drop into the right eye every morning.     ferrous sulfate 325 (65 FE) MG EC tablet  Take 1 tablet (325 mg total) by mouth once daily.     HYDROcodone-acetaminophen 7.5-325 mg per tablet  Commonly known as: NORCO  Take 1 tablet by mouth every 6 (six) hours as needed for Pain.     latanoprost 0.005 % ophthalmic solution  Place 1 drop into the right eye every evening.     multivitamin tablet  Commonly known as: THERAGRAN  Take 1 tablet by mouth once daily.     polyethylene glycol 17 gram Pwpk  Commonly known as: GLYCOLAX  Take 17 g by mouth every other day.     senna-docusate 8.6-50 mg 8.6-50 mg per tablet  Commonly known as: PERICOLACE  Take 1 tablet by mouth 2 (two) times daily.     timolol maleate 0.5% 0.5 % Drop  Commonly known as: TIMOPTIC  Place 1 drop into the right eye once daily.        STOP taking these medications    amLODIPine 5 MG tablet  Commonly known as: NORVASC    with chest pain secondary to non-STEMI.  Coronary angiogram showed 100% chronic occlusion in the right coronary artery.  Echocardiogram showed mild hypokinesis in inferolateral wall.  Cardiac MRI showed high probability of viability in the territory of RCA.  Patient was recommended staged PCI for chronic total occlusion of RCA with Dr. Haro.  He was initiated on aspirin, atorvastatin, metoprolol, and isosorbide mononitrate.  He is also on as needed nitroglycerin.    Today, patient is here accompanied by his wife.  He reports significant improvement in his chest pain since recent hospitalization.  He is experiencing some headache during the night as he has been taking isosorbide at night.  He still has mild occasional chest discomfort rating 2-3/10. He denies fatigue, lightheadedness, shortness of breath, dyspnea on exertion, orthopnea, PND, palpitations, abdominal fullness/bloating and lower extremity edema. He further denies fever, chills, N/V, diarrhea, vomiting, constipation, hematochezia, hematemesis, hemoptysis. He denies bleeding or clotting disorders, COPD/Asthma/PANCHO, any major reactions to anesthesia or IV contrast.    Cardiac MRI on 7/26/2022-reviewed  SUMMARY  1.  Left ventricular size is at the upper limits of normal. Wall thickness is normal. Systolic function is  mildly reduced with inferior and inferolateral wall hypokinesis. The quantified left ventricular ejection  fraction is 49%.   2.  Moderate-sized area of myocardial edema and decreased resting perfusion in the [id number] inferior  and inferolateral wall consistent with acute-to-subacute myocardial infarction in the territory of the  right coronary artery.  3.  Minimal subendocardial scar extending to 1-25% the thickness of the basal inferior and inferolateral  walls suggesting a high probability of viability in the the territory of the right coronary artery.   4.  Right ventricular size is mildly enlarged. Systolic function is mildly reduced.    hydroCHLOROthiazide 25 MG tablet  Commonly known as: HYDRODIURIL            Indwelling Lines/Drains at time of discharge:   none       Time spent on the discharge of patient: 40 minutes         Mabel Huber PA-C  Department of Hospital Medicine  [facility]    The quantified right  ventricular ejection fraction is 45%.   5.  Mild left atrial enlargement.  6.  No significant valvular abnormalities.    Echocardiogram on 7/24/2022-reviewed  Interpretation Summary     1. Left ventricular size and wall thickness are normal. There is mild  inferolateral wall hypokinesis although overall systolic function is normal.  The calculated left ventricular ejection fraction is 57%.  2. Right ventricular size and systolic function are normal.  3. No hemodynamically significant valvular abnormalities.  4. No prior study available for comparison.    Coronary Angiogram done on 7/25/2022-reviewed:  Conclusion    Left ventricular filling pressures are normal.    Prox LAD to Mid LAD lesion is 20% stenosed.    Prox RCA to Mid RCA lesion is 100% stenosed.    Global LV function mild reduction with akintis inferobase   Plan    Follow bedrest per protocol    Continued medical management and lifestyle modifications for cardiovascular risk factor optimizations.    Arterial sheath removed from radial artery with TR band placement.  Patient with total occlusion of late proximal RCA.  RCA appears to be a large-caliber vessel.  LV suggests akinesis of the inferior base.  Considerations may be to evaluate for extent of viability of the madelyn-infarct region.  If sizable viable tissue remains could be candidate for  PCI.  For now maximize medical therapy beta-blockers, nitrates, and risk factor reduction.  If minimal viable tissue is present and patient asymptomatic with defer revascularization considerations     Recommendations  General Recommendations:  - Patient given specific instructions regarding care of arteriotomy site, activity restrictions, signs and symptoms of cardiac or vascular complications and to seek immediate medical evaluation should they occur.   - Arterial sheath removed from radial artery with TR Band placement.        Physical Examination Review of Systems   /60 (BP Location:  Left arm, Patient Position: Sitting, Cuff Size: Adult Large)   Pulse 71   Resp 14   Wt 100.2 kg (221 lb)   BMI 26.90 kg/m    Body mass index is 26.9 kg/m .  Wt Readings from Last 3 Encounters:   08/02/22 100.2 kg (221 lb)   07/27/22 98.8 kg (217 lb 12.8 oz)   12/10/14 107.5 kg (237 lb)     General Appearance:   no distress, normal body habitus   ENT/Mouth: membranes moist, no oral lesions or bleeding gums.      EYES:  no scleral icterus, normal conjunctivae   Neck: no carotid bruits or thyromegaly   Chest/Lungs:   lungs are clear to auscultation, no rales or wheezing, equal chest wall expansion    Cardiovascular:   Heart rate regular. Normal first and second heart sounds with no murmurs, rubs, or gallops; the carotid, radial and posterior tibial pulses are intact, Jugular venous pressure flat with no edema bilaterally    Abdomen:  no organomegaly, masses, bruits, or tenderness; bowel sounds are present   Extremities   no cyanosis or clubbing. Right radial puncture site intact. Radial pulses and Pedal pulses intact and symmetrical.  CMS intact.   Skin: no xanthelasma, warm.    Neurologic: normal  bilateral, no tremors   Psychiatric: alert and oriented x3, calm               Negative unless noted in HPI     Medical History  Surgical History Family History Social History   Past Medical History:   Diagnosis Date     Benign essential hypertension      Gastroesophageal reflux disease with esophagitis     Past Surgical History:   Procedure Laterality Date     CERVICAL LAMINECTOMY       CV CORONARY ANGIOGRAM N/A 7/25/2022    Procedure: CV CORONARY ANGIOGRAM;  Surgeon: Quentin Miranda MD;  Location: [facility] CV     CV LEFT HEART CATH N/A 7/25/2022    Procedure: Left Heart Catheterization;  Surgeon: Quentin Miranda MD;  Location: [facility] CV     CV LEFT VENTRICULOGRAM N/A 7/25/2022    Procedure: Left Ventriculogram;  Surgeon: Quentin Miranda MD;  Location: [facility] CV    Family  History   Problem Relation Age of Onset     Brain Cancer Mother      No Known Problems Father      Coronary Artery Disease Early Onset No family hx of     Social History     Socioeconomic History     Marital status:      Spouse name: Not on file     Number of children: Not on file     Years of education: Not on file     Highest education level: Not on file   Occupational History     Not on file   Tobacco Use     Smoking status: Never Smoker     Smokeless tobacco: Never Used   Substance and Sexual Activity     Alcohol use: Yes     Alcohol/week: 1.0 standard drink     Comment: Alcoholic Drinks/day: One drink twice a month per pt     Drug use: No     Sexual activity: Not on file   Other Topics Concern     Not on file   Social History Narrative    Patient still works heavy physical labor.  He is .     Social Determinants of Health     Financial Resource Strain: Not on file   Food Insecurity: Not on file   Transportation Needs: Not on file   Physical Activity: Not on file   Stress: Not on file   Social Connections: Not on file   Intimate Partner Violence: Not on file   Housing Stability: Not on file          Medications  Allergies   Current Outpatient Medications   Medication Sig Dispense Refill     aspirin (ASA) 81 MG chewable tablet Take 1 tablet (81 mg) by mouth daily for 30 days 30 tablet 0     atorvastatin (LIPITOR) 40 MG tablet Take 1 tablet (40 mg) by mouth daily for 30 days 30 tablet 0     cholecalciferol (VITAMIN D3) 125 mcg (5000 units) capsule Take 125 mcg by mouth daily       isosorbide mononitrate (IMDUR) 30 MG 24 hr tablet Take 1 tablet (30 mg) by mouth daily for 30 days DO NOT CRUSH. Can split tablet in half along score therese. 30 tablet 0     lisinopril (PRINIVIL,ZESTRIL) 10 MG tablet [LISINOPRIL (PRINIVIL,ZESTRIL) 10 MG TABLET] Take 10 mg by mouth daily.       metoprolol succinate ER (TOPROL XL) 25 MG 24 hr tablet Take 1 tablet (25 mg) by mouth daily for 30 days 30 tablet 0     nitroGLYcerin  "(NITROSTAT) 0.4 MG sublingual tablet One tablet under the tongue every 5 minutes if needed for chest pain. May repeat every 5 minutes for a maximum of 3 doses in 15 minutes\" 25 tablet 3     pantoprazole (PROTONIX) 40 MG tablet [PANTOPRAZOLE (PROTONIX) 40 MG TABLET] Take 40 mg by mouth daily.       vitamin E (TOCOPHEROL) 400 units (180 mg) capsule Take 400 Units by mouth daily      No Known Allergies      Lab Results    Chemistry/lipid CBC Cardiac Enzymes/BNP/TSH/INR   Lab Results   Component Value Date    CHOL 184 07/25/2022    HDL 31 (L) 07/25/2022    TRIG 171 (H) 07/25/2022    BUN 15 07/26/2022     (L) 07/26/2022    CO2 24 07/26/2022    Lab Results   Component Value Date    WBC 5.8 07/27/2022    HGB 12.9 (L) 07/27/2022    HCT 37.3 (L) 07/27/2022    MCV 89 07/27/2022     07/27/2022    Lab Results   Component Value Date    TROPONINI 7.53 (HH) 07/25/2022    BNP 24 07/24/2022    TSH 2.04 07/25/2022    INR 1.01 07/24/2022        42 minutes spent on the date of encounter doing chart review, review of test results, interpretation with above tests, patient visit, documentation and discussion with family.        This note has been dictated using voice recognition software. Any grammatical, typographical, or context distortions are unintentional and inherent to the software                    Thank you for allowing me to participate in the care of your patient.      Sincerely,     SUSHMA Hammond [facility]  cc:   Quentin Miranda MD  [address] [place] [address]        "

## 2024-05-25 ENCOUNTER — HOSPITAL ENCOUNTER (EMERGENCY)
Facility: OTHER | Age: 89
Discharge: HOME | End: 2024-05-25
Attending: EMERGENCY MEDICINE
Payer: MEDICARE

## 2024-05-25 VITALS
RESPIRATION RATE: 16 BRPM | HEART RATE: 90 BPM | WEIGHT: 150 LBS | DIASTOLIC BLOOD PRESSURE: 88 MMHG | TEMPERATURE: 98 F | BODY MASS INDEX: 23.54 KG/M2 | SYSTOLIC BLOOD PRESSURE: 148 MMHG | OXYGEN SATURATION: 98 % | HEIGHT: 67 IN

## 2024-05-25 DIAGNOSIS — R06.02 SOB (SHORTNESS OF BREATH): ICD-10-CM

## 2024-05-25 DIAGNOSIS — R05.9 COUGH: ICD-10-CM

## 2024-05-25 LAB
ALBUMIN SERPL BCP-MCNC: 3.5 G/DL (ref 3.5–5.2)
ALP SERPL-CCNC: 83 U/L (ref 55–135)
ALT SERPL W/O P-5'-P-CCNC: 21 U/L (ref 10–44)
ANION GAP SERPL CALC-SCNC: 15 MMOL/L (ref 8–16)
AST SERPL-CCNC: 20 U/L (ref 10–40)
BASOPHILS # BLD AUTO: 0.03 K/UL (ref 0–0.2)
BASOPHILS NFR BLD: 0.3 % (ref 0–1.9)
BILIRUB SERPL-MCNC: 0.4 MG/DL (ref 0.1–1)
BNP SERPL-MCNC: 92 PG/ML (ref 0–99)
BUN SERPL-MCNC: 10 MG/DL (ref 10–30)
CALCIUM SERPL-MCNC: 9.2 MG/DL (ref 8.7–10.5)
CHLORIDE SERPL-SCNC: 104 MMOL/L (ref 95–110)
CO2 SERPL-SCNC: 18 MMOL/L (ref 23–29)
CREAT SERPL-MCNC: 0.8 MG/DL (ref 0.5–1.4)
CTP QC/QA: YES
CTP QC/QA: YES
D DIMER PPP IA.FEU-MCNC: 0.6 MG/L FEU
DIFFERENTIAL METHOD BLD: ABNORMAL
EOSINOPHIL # BLD AUTO: 0.1 K/UL (ref 0–0.5)
EOSINOPHIL NFR BLD: 1.4 % (ref 0–8)
ERYTHROCYTE [DISTWIDTH] IN BLOOD BY AUTOMATED COUNT: 12.4 % (ref 11.5–14.5)
EST. GFR  (NO RACE VARIABLE): >60 ML/MIN/1.73 M^2
GLUCOSE SERPL-MCNC: 234 MG/DL (ref 70–110)
HCT VFR BLD AUTO: 36.3 % (ref 37–48.5)
HGB BLD-MCNC: 11.6 G/DL (ref 12–16)
IMM GRANULOCYTES # BLD AUTO: 0.03 K/UL (ref 0–0.04)
IMM GRANULOCYTES NFR BLD AUTO: 0.3 % (ref 0–0.5)
LYMPHOCYTES # BLD AUTO: 2 K/UL (ref 1–4.8)
LYMPHOCYTES NFR BLD: 20.9 % (ref 18–48)
MCH RBC QN AUTO: 29 PG (ref 27–31)
MCHC RBC AUTO-ENTMCNC: 32 G/DL (ref 32–36)
MCV RBC AUTO: 91 FL (ref 82–98)
MONOCYTES # BLD AUTO: 0.6 K/UL (ref 0.3–1)
MONOCYTES NFR BLD: 6.6 % (ref 4–15)
NEUTROPHILS # BLD AUTO: 6.6 K/UL (ref 1.8–7.7)
NEUTROPHILS NFR BLD: 70.5 % (ref 38–73)
NRBC BLD-RTO: 0 /100 WBC
PLATELET # BLD AUTO: 273 K/UL (ref 150–450)
PMV BLD AUTO: 11 FL (ref 9.2–12.9)
POC MOLECULAR INFLUENZA A AGN: NEGATIVE
POC MOLECULAR INFLUENZA B AGN: NEGATIVE
POTASSIUM SERPL-SCNC: 3.9 MMOL/L (ref 3.5–5.1)
PROT SERPL-MCNC: 7.9 G/DL (ref 6–8.4)
RBC # BLD AUTO: 4 M/UL (ref 4–5.4)
SARS-COV-2 RDRP RESP QL NAA+PROBE: NEGATIVE
SODIUM SERPL-SCNC: 137 MMOL/L (ref 136–145)
TROPONIN I SERPL DL<=0.01 NG/ML-MCNC: 0.02 NG/ML (ref 0–0.03)
WBC # BLD AUTO: 9.41 K/UL (ref 3.9–12.7)

## 2024-05-25 PROCEDURE — 93010 ELECTROCARDIOGRAM REPORT: CPT | Mod: ,,, | Performed by: INTERNAL MEDICINE

## 2024-05-25 PROCEDURE — 83880 ASSAY OF NATRIURETIC PEPTIDE: CPT | Performed by: PHYSICIAN ASSISTANT

## 2024-05-25 PROCEDURE — 25000242 PHARM REV CODE 250 ALT 637 W/ HCPCS: Performed by: PHYSICIAN ASSISTANT

## 2024-05-25 PROCEDURE — 25500020 PHARM REV CODE 255: Performed by: PHYSICIAN ASSISTANT

## 2024-05-25 PROCEDURE — 87635 SARS-COV-2 COVID-19 AMP PRB: CPT | Performed by: PHYSICIAN ASSISTANT

## 2024-05-25 PROCEDURE — 85025 COMPLETE CBC W/AUTO DIFF WBC: CPT | Performed by: PHYSICIAN ASSISTANT

## 2024-05-25 PROCEDURE — 93005 ELECTROCARDIOGRAM TRACING: CPT

## 2024-05-25 PROCEDURE — 85379 FIBRIN DEGRADATION QUANT: CPT | Performed by: PHYSICIAN ASSISTANT

## 2024-05-25 PROCEDURE — 84484 ASSAY OF TROPONIN QUANT: CPT | Performed by: PHYSICIAN ASSISTANT

## 2024-05-25 PROCEDURE — 25000003 PHARM REV CODE 250: Performed by: PHYSICIAN ASSISTANT

## 2024-05-25 PROCEDURE — 80053 COMPREHEN METABOLIC PANEL: CPT | Performed by: PHYSICIAN ASSISTANT

## 2024-05-25 PROCEDURE — 94640 AIRWAY INHALATION TREATMENT: CPT

## 2024-05-25 PROCEDURE — 99285 EMERGENCY DEPT VISIT HI MDM: CPT | Mod: 25

## 2024-05-25 RX ORDER — PANTOPRAZOLE SODIUM 40 MG/1
40 TABLET, DELAYED RELEASE ORAL
Status: COMPLETED | OUTPATIENT
Start: 2024-05-25 | End: 2024-05-25

## 2024-05-25 RX ORDER — PANTOPRAZOLE SODIUM 20 MG/1
20 TABLET, DELAYED RELEASE ORAL DAILY
Qty: 30 TABLET | Refills: 0 | Status: SHIPPED | OUTPATIENT
Start: 2024-05-25 | End: 2025-05-25

## 2024-05-25 RX ORDER — ALBUTEROL SULFATE 90 UG/1
2 AEROSOL, METERED RESPIRATORY (INHALATION) EVERY 8 HOURS
Status: DISCONTINUED | OUTPATIENT
Start: 2024-05-25 | End: 2024-05-25 | Stop reason: HOSPADM

## 2024-05-25 RX ORDER — BENZONATATE 100 MG/1
100 CAPSULE ORAL 3 TIMES DAILY PRN
Status: DISCONTINUED | OUTPATIENT
Start: 2024-05-25 | End: 2024-05-25 | Stop reason: HOSPADM

## 2024-05-25 RX ORDER — BENZONATATE 100 MG/1
100 CAPSULE ORAL 3 TIMES DAILY PRN
Qty: 20 CAPSULE | Refills: 0 | Status: SHIPPED | OUTPATIENT
Start: 2024-05-25 | End: 2024-06-04

## 2024-05-25 RX ADMIN — PANTOPRAZOLE SODIUM 40 MG: 40 TABLET, DELAYED RELEASE ORAL at 12:05

## 2024-05-25 RX ADMIN — IOHEXOL 75 ML: 350 INJECTION, SOLUTION INTRAVENOUS at 02:05

## 2024-05-25 RX ADMIN — BENZONATATE 100 MG: 100 CAPSULE ORAL at 02:05

## 2024-05-25 RX ADMIN — ALBUTEROL SULFATE 2 PUFF: 90 AEROSOL, METERED RESPIRATORY (INHALATION) at 03:05

## 2024-05-25 NOTE — FIRST PROVIDER EVALUATION
Emergency Department TeleTriage Encounter Note      CHIEF COMPLAINT    Chief Complaint   Patient presents with    Cough     Daughter reports SOB and cough, doctors appointment on Wednesday but didn't want to wait. Pt is in no obvious respiratory distress at this time and is 97% on RA.        VITAL SIGNS   Initial Vitals [05/25/24 1126]   BP Pulse Resp Temp SpO2   (!) 159/81 96 18 97.8 °F (36.6 °C) 97 %      MAP       --            ALLERGIES    Review of patient's allergies indicates:  No Known Allergies    PROVIDER TRIAGE NOTE  Patient presents with cough and shortness of breath for 3 weeks. No chest pain. No fever. Mild edema       ORDERS  Labs Reviewed   CBC W/ AUTO DIFFERENTIAL   COMPREHENSIVE METABOLIC PANEL   SARS-COV-2 RDRP GENE   POCT INFLUENZA A/B MOLECULAR   POCT GLUCOSE MONITORING CONTINUOUS       ED Orders (720h ago, onward)      Start Ordered     Status Ordering Provider    05/25/24 1132 05/25/24 1132  POCT COVID-19 Rapid Screening  Once         Ordered ODETTE SUERO    05/25/24 1132 05/25/24 1132  POCT Influenza A/B Molecular  Once         Ordered ODETTE SUERO    05/25/24 1132 05/25/24 1132  CBC auto differential  STAT         Ordered ODETTE SUERO    05/25/24 1132 05/25/24 1132  Comprehensive metabolic panel  STAT         Ordered ODETTE SUERO    05/25/24 1132 05/25/24 1132  Insert Saline lock IV  Once         Ordered ODETTE SUERO    05/25/24 1132 05/25/24 1132  POCT glucose  Once         Ordered ODETTE SUERO              Virtual Visit Note: The provider triage portion of this emergency department evaluation and documentation was performed via [employer], a HIPAA-compliant telemedicine application, in concert with a tele-presenter in the room. A face to face patient evaluation with one of my colleagues will occur once the patient is placed in an emergency department room.      DISCLAIMER: This note was prepared with M*Modal voice recognition transcription  software. Garbled syntax, mangled pronouns, and other bizarre constructions may be attributed to that software system.

## 2024-05-25 NOTE — ED PROVIDER NOTES
"     Source of History:  Daughter and patient     Chief complaint:  Cough (Daughter reports SOB and cough, doctors appointment on Wednesday but didn't want to wait. Pt is in no obvious respiratory distress at this time and is 97% on RA. )      HPI:  Marylu Fenton is a 89 y.o. female presenting with cough.  Daughter and patient state that cough has been ongoing for nearly 3 weeks.  They did see her PCP and she was instructed to take DayQuil, NyQuil and loratadine.  She has been taking this with no improvement symptoms.  Daughter does report that cough is worse at night.  Deny any productive nature.  Deny any significant change in activity or fever.  Patient endorses occasional shortness of breath.  Daughter has not noted any change.  She does endorse maybe occasional wheezing.    This is the extent to the patients complaints today here in the emergency department.    ROS: As per HPI    Review of patient's allergies indicates:  No Known Allergies    PMH:  As per HPI and below:  Past Medical History:   Diagnosis Date    GERD (gastroesophageal reflux disease)     Glaucoma     History of small bowel obstruction     Hyperlipidemia     Hypertension     Iron deficiency anemia     Type II diabetes mellitus      Past Surgical History:   Procedure Laterality Date    CHOLECYSTECTOMY      EXPLORATORY LAPAROTOMY  04/14/2016    SBO    HEMIARTHROPLASTY OF HIP Left 11/11/2021    Procedure: HEMIARTHROPLASTY, HIP;  Surgeon: Jose Quintana MD;  Location: [facility];  Service: Orthopedics;  Laterality: Left;    HYSTERECTOMY         Social History     Tobacco Use    Smoking status: Never    Smokeless tobacco: Never   Substance Use Topics    Alcohol use: No    Drug use: No       Physical Exam:    BP (!) 148/88   Pulse 90   Temp 97.8 °F (36.6 °C) (Oral)   Resp 16   Ht 5' 7" (1.702 m)   Wt 68 kg (150 lb)   SpO2 98%   BMI 23.49 kg/m²   Nursing note and vital signs reviewed.  Constitutional: No acute distress.  Nontoxic  Eyes: No " conjunctival injection.Extraocular muscles are intact.  ENT: Oropharynx clear.  Normal phonation.   Cardiovascular: Regular rate and rhythm.  No murmurs. No gallops. No rubs  Respiratory: Clear to auscultation bilaterally.  Good air movement.  No wheezes.  No rhonchi. No rales. No accessory muscle use.  Abdomen: Soft.  Not distended.  Nontender.  No guarding.  No rebound. Non-peritoneal.  Musculoskeletal:  Limited range of motion as patient is wheelchair-bound.  Fair passive range of motion.  1+ pitting edema to bilateral lower extremity  Skin: No rashes seen.  Good turgor.  No abrasions.  No ecchymoses.  Neurologic: Motor intact.  Sensation intact.  No ataxia. No focal neurological deficits.  Psych: Appropriate, conversant    Labs that have been ordered have been independently reviewed and interpreted by myself.    I decided to obtain the patient's medical records.      MDM/ Differential Dx:    Marylu Fenton 89 y.o. presented to the ED with c/o SOB. Physical exam reveals well-appearing female in no distress sitting in wheelchair comfortably with daughter at the bedside.  Patient answers questions appropriately.  History also supported and added to by daughter at the bedside.  She appears in no distress.  Occasional dry cough during interview.  Lungs clear to auscultation.  Slight 1+ edema to bilateral lower extremity.  No calf tenderness or palpable cord on exam.    Differential Diagnosis includes, but is not limited to:  PE, MI/ACS, pneumothorax, pericardial effusion/tamonade, pneumonia, lung abscess, pericarditis/myocarditis, pleural effusion, lung mass, CHF exacerbation, asthma exacerbation, COPD exacerbation, aspirated/ingested foreign body, airway obstruction, CO poisoning, anemia, metabolic derangement, allergy/atopy, influenza, viral URI, viral syndrome.      ED management:  Patient with initial infectious swabs ordered given triage complaints however upon further discussion cough appears more chronic in  nature.  Did review patient's medication list with no obvious offending agent.  Did discuss multiple etiologies of cough and as she does endorse shortness of breath and has sedentary lifestyle and PERC score positive will obtain labs including BNP and D-dimer.  Will also obtain chest x-ray.  Labs grossly unremarkable including troponin and BNP.    EKG reveals normal sinus rhythm; rate 95, no STEMI.      Chest x-ray with no acute consolidation.  Some bilateral atelectasis noted.  D-dimer was noted to be positive hence discuss further evaluation of shortness of breath with CT PE steady.  No acute PE noted.  Once again chronic lung changes noted.  Discuss a trial of Protonix for any GERD addition and initiating low side effect profile medicine of inhaler and Tessalon Perles which were tolerated well here with some improvement.  She does have an appointment with her PCP this week and encouraged to keep this appointment.  No findings at this time to suggest respiratory distress or hypoxia.  Patient continues to deny any chest pain and appears in no distress    Impression/Plan: Patient informed of diagnosis Diagnoses of SOB (shortness of breath) and Cough were pertinent to this visit. Patient will follow up with Primary.  Patient and daughter cautioned on when to return to ED.  Pt. and daughter Understands and agrees with current treatment plan      Results for orders placed or performed during the hospital encounter of 05/25/24   CBC auto differential   Result Value Ref Range    WBC 9.41 3.90 - 12.70 K/uL    RBC 4.00 4.00 - 5.40 M/uL    Hemoglobin 11.6 (L) 12.0 - 16.0 g/dL    Hematocrit 36.3 (L) 37.0 - 48.5 %    MCV 91 82 - 98 fL    MCH 29.0 27.0 - 31.0 pg    MCHC 32.0 32.0 - 36.0 g/dL    RDW 12.4 11.5 - 14.5 %    Platelets 273 150 - 450 K/uL    MPV 11.0 9.2 - 12.9 fL    Immature Granulocytes 0.3 0.0 - 0.5 %    Gran # (ANC) 6.6 1.8 - 7.7 K/uL    Immature Grans (Abs) 0.03 0.00 - 0.04 K/uL    Lymph # 2.0 1.0 - 4.8 K/uL     Mono # 0.6 0.3 - 1.0 K/uL    Eos # 0.1 0.0 - 0.5 K/uL    Baso # 0.03 0.00 - 0.20 K/uL    nRBC 0 0 /100 WBC    Gran % 70.5 38.0 - 73.0 %    Lymph % 20.9 18.0 - 48.0 %    Mono % 6.6 4.0 - 15.0 %    Eosinophil % 1.4 0.0 - 8.0 %    Basophil % 0.3 0.0 - 1.9 %    Differential Method Automated    Comprehensive metabolic panel   Result Value Ref Range    Sodium 137 136 - 145 mmol/L    Potassium 3.9 3.5 - 5.1 mmol/L    Chloride 104 95 - 110 mmol/L    CO2 18 (L) 23 - 29 mmol/L    Glucose 234 (H) 70 - 110 mg/dL    BUN 10 10 - 30 mg/dL    Creatinine 0.8 0.5 - 1.4 mg/dL    Calcium 9.2 8.7 - 10.5 mg/dL    Total Protein 7.9 6.0 - 8.4 g/dL    Albumin 3.5 3.5 - 5.2 g/dL    Total Bilirubin 0.4 0.1 - 1.0 mg/dL    Alkaline Phosphatase 83 55 - 135 U/L    AST 20 10 - 40 U/L    ALT 21 10 - 44 U/L    eGFR >60 >60 mL/min/1.73 m^2    Anion Gap 15 8 - 16 mmol/L   Brain natriuretic peptide   Result Value Ref Range    BNP 92 0 - 99 pg/mL   D dimer, quantitative   Result Value Ref Range    D-Dimer 0.60 (H) <0.50 mg/L FEU   Troponin I   Result Value Ref Range    Troponin I 0.018 0.000 - 0.026 ng/mL   POCT COVID-19 Rapid Screening   Result Value Ref Range    POC Rapid COVID Negative Negative     Acceptable Yes    POCT Influenza A/B Molecular   Result Value Ref Range    POC Molecular Influenza A Ag Negative Negative    POC Molecular Influenza B Ag Negative Negative     Acceptable Yes      Imaging Results              CTA Chest Non-Coronary (PE Studies) (Final result)  Result time 05/25/24 14:38:05      Final result by Tra De Luna MD (05/25/24 14:38:05)                   Impression:      No evidence of pulmonary embolism.    No acute process in the chest.      Electronically signed by: Tra De Luna MD  Date:    05/25/2024  Time:    14:38               Narrative:    EXAMINATION:  CTA CHEST NON CORONARY (PE STUDIES)    CLINICAL HISTORY:  Pulmonary embolism (PE) suspected, unknown D-dimer;    TECHNIQUE:  Low  dose axial images, sagittal and coronal reformations were obtained from the thoracic inlet to the lung bases following the IV administration of 75 mL of Omnipaque 350.  Contrast timing was optimized to evaluate the pulmonary arteries.  MIP images were performed.    COMPARISON:  None    FINDINGS:  Pulmonary Arteries: This study is adequate for the evaluation of pulmonary thromboembolism.  No evidence of pulmonary embolism to the level of the subsegmental arteries bilaterally.    Soft tissues of the neck:  Visualized portion of the thyroid is normal in appearance.    Thoracic aorta:  Normal in caliber and contour.  No evidence of dissection.    Heart: No cardiomegaly.  No pericardial effusion.    Lymph nodes:  No evidence of mediastinal, hilar, or axillary lymphadenopathy.    Trachea and bronchi: Patent.    Lungs:  Lungs are well expanded.  Minimal atelectasis of the lower lobes bilaterally.  No focal consolidation.  No pleural effusion.  No pneumothorax.    Limited evaluation of the upper abdomen:  Material within the esophagus which may be seen with gastroesophageal reflux disease.    Osseous structures:  No evidence of fractures or suspicious osseous lesions.                                       X-Ray Chest AP Portable (Final result)  Result time 05/25/24 12:52:35      Final result by Ron Dong MD (05/25/24 12:52:35)                   Impression:      1. Chronic appearing interstitial findings, no large focal consolidation.      Electronically signed by: Ron Dong MD  Date:    05/25/2024  Time:    12:52               Narrative:    EXAMINATION:  XR CHEST AP PORTABLE    CLINICAL HISTORY:  Cough, unspecified    TECHNIQUE:  Single frontal view of the chest was performed.    COMPARISON:  09/05/2023    FINDINGS:  The cardiomediastinal silhouette is not enlarged noting tortuosity of the aorta..  There is no pleural effusion.  The trachea is midline.  The lungs are symmetrically expanded bilaterally with  coarse interstitial attenuation and bilateral basilar subsegmental atelectasis..  No large focal consolidation seen.  There is no pneumothorax.  The osseous structures are remarkable for degenerative change..                                                Diagnostic Impression:    1. SOB (shortness of breath)    2. Cough         ED Disposition Condition    Discharge Stable            ED Prescriptions       Medication Sig Dispense Start Date End Date Auth. Provider    benzonatate (TESSALON) 100 MG capsule Take 1 capsule (100 mg total) by mouth 3 (three) times daily as needed. 20 capsule 5/25/2024 6/4/2024 Kayley Parker PA    pantoprazole (PROTONIX) 20 MG tablet Take 1 tablet (20 mg total) by mouth once daily. 30 tablet 5/25/2024 5/25/2025 Kayley Parker PA          Follow-up Information       Follow up With Specialties Details Why Contact Info    Kathy Parra MD Internal Medicine Schedule an appointment as soon as possible for a visit   [address] [place] [address]  [phone]               Kayley Parker PA  05/25/24 7516

## 2024-05-27 LAB
OHS QRS DURATION: 74 MS
OHS QTC CALCULATION: 454 MS

## 2024-06-20 DIAGNOSIS — M25.561 BILATERAL KNEE PAIN: Primary | ICD-10-CM

## 2024-06-20 DIAGNOSIS — M25.562 BILATERAL KNEE PAIN: Primary | ICD-10-CM

## 2024-06-21 ENCOUNTER — HOSPITAL ENCOUNTER (OUTPATIENT)
Dept: RADIOLOGY | Facility: OTHER | Age: 89
Discharge: HOME | End: 2024-06-21
Attending: STUDENT IN AN ORGANIZED HEALTH CARE EDUCATION/TRAINING PROGRAM
Payer: MEDICARE

## 2024-06-21 DIAGNOSIS — M25.562 BILATERAL KNEE PAIN: ICD-10-CM

## 2024-06-21 DIAGNOSIS — M25.561 BILATERAL KNEE PAIN: ICD-10-CM

## 2024-06-21 PROCEDURE — 73562 X-RAY EXAM OF KNEE 3: CPT | Mod: TC,50,FY

## 2024-06-21 PROCEDURE — 73562 X-RAY EXAM OF KNEE 3: CPT | Mod: 26,50,, | Performed by: RADIOLOGY

## 2024-10-15 ENCOUNTER — OFFICE VISIT (OUTPATIENT)
Dept: PULMONOLOGY | Facility: CLINIC | Age: 89
End: 2024-10-15
Payer: MEDICARE

## 2024-10-15 VITALS
RESPIRATION RATE: 18 BRPM | BODY MASS INDEX: 23.53 KG/M2 | HEART RATE: 82 BPM | WEIGHT: 149.94 LBS | SYSTOLIC BLOOD PRESSURE: 142 MMHG | OXYGEN SATURATION: 100 % | HEIGHT: 67 IN | DIASTOLIC BLOOD PRESSURE: 82 MMHG

## 2024-10-15 DIAGNOSIS — J45.20 MILD INTERMITTENT ASTHMA, UNSPECIFIED WHETHER COMPLICATED: ICD-10-CM

## 2024-10-15 DIAGNOSIS — R06.09 DOE (DYSPNEA ON EXERTION): ICD-10-CM

## 2024-10-15 DIAGNOSIS — R05.3 CHRONIC COUGH: Primary | ICD-10-CM

## 2024-10-15 PROCEDURE — 99203 OFFICE O/P NEW LOW 30 MIN: CPT | Mod: S$GLB,,,

## 2024-10-15 PROCEDURE — 1159F MED LIST DOCD IN RCRD: CPT | Mod: CPTII,S$GLB,,

## 2024-10-15 PROCEDURE — 99999 PR PBB SHADOW E&M-EST. PATIENT-LVL IV: CPT | Mod: PBBFAC,,,

## 2024-10-15 PROCEDURE — 1126F AMNT PAIN NOTED NONE PRSNT: CPT | Mod: CPTII,S$GLB,,

## 2024-10-15 PROCEDURE — 1101F PT FALLS ASSESS-DOCD LE1/YR: CPT | Mod: CPTII,S$GLB,,

## 2024-10-15 PROCEDURE — 3288F FALL RISK ASSESSMENT DOCD: CPT | Mod: CPTII,S$GLB,,

## 2024-10-15 RX ORDER — IPRATROPIUM BROMIDE AND ALBUTEROL SULFATE 2.5; .5 MG/3ML; MG/3ML
3 SOLUTION RESPIRATORY (INHALATION) EVERY 6 HOURS PRN
Qty: 120 ML | Refills: 5 | Status: SHIPPED | OUTPATIENT
Start: 2024-10-15 | End: 2025-10-15

## 2024-10-15 RX ORDER — BUDESONIDE AND FORMOTEROL FUMARATE DIHYDRATE 160; 4.5 UG/1; UG/1
2 AEROSOL RESPIRATORY (INHALATION) EVERY 12 HOURS
Qty: 10.2 G | Refills: 11 | Status: SHIPPED | OUTPATIENT
Start: 2024-10-15 | End: 2025-10-15

## 2024-10-15 NOTE — PROGRESS NOTES
"History and Physical Note  [facility]    Subjective:     Reason for visit: Chronic cough     Patient ID:  Marylu Fenton is a 89 y.o. female    Interval History 10/15/2024:  Patient is here as a referral from her PCP for evaluation of chronic cough. She is here with her son Al that provides additional history.   Cough has been present for months now. Mostly couging at night time. Mostly dry and intermittently productive with clear sputum. Trailed antihistamine use for allergies with no relief. No ace inhibitor use.     No fevers or unintentional weight loss.  She is struggling with chest tightness and audible wheezing intermittently.     She has been living in the same home for many years. No flood during [place]. No previous exposure to asbestos.     No viral infection in the last 3 months.     No postprandial cough. No dysphagia. She is on a PPI for gastric reflux. She maintains the HOB elevated while sleeping. She eats her last meal about 4 hours before she lays down to sleep     She was given an albuterol rescue inhaler and this does provide relief but only short term.     Additional Pulmonary History:  Environmental Exposures: no asbestos exposure  Exposure to Animals/Pets: None;   Travel History: none  Childhood Illnesses:  none  Tobacco/Smoking: life time non-smoker  Social History     Tobacco Use   Smoking Status Never   Smokeless Tobacco Never       Objective:     Vitals:    10/15/24 0831   BP: (!) 142/82   Pulse: 82   Resp: 18   SpO2: 100%   Weight: 68 kg (149 lb 14.6 oz)   Height: 5' 7" (1.702 m)         Physical Exam  Constitutional:       Appearance: Normal appearance. She is well-developed.   HENT:      Head: Normocephalic.   Cardiovascular:      Rate and Rhythm: Normal rate.      Pulses: Normal pulses.      Heart sounds: Normal heart sounds, S1 normal and S2 normal.   Pulmonary:      Effort: Pulmonary effort is normal.      Breath sounds: Normal breath sounds. No decreased breath sounds. "   Musculoskeletal:      Right lower leg: No edema.      Left lower leg: No edema.   Skin:     General: Skin is warm.      Capillary Refill: Capillary refill takes less than 2 seconds.      Findings: No rash.      Nails: There is no clubbing.   Neurological:      Mental Status: She is alert and oriented to person, place, and time. Mental status is at baseline.   Psychiatric:         Attention and Perception: Attention normal.         Mood and Affect: Mood and affect normal.         Speech: Speech normal.         Behavior: Behavior is cooperative.          Personal Diagnostic Review and Interpretation  05/25/2024 CTA Chest:   Lungs: Lungs are well expanded. Minimal atelectasis of the lower lobes bilaterally. No focal consolidation. No pleural effusion. No pneumothorax.     Chest X-Ray 09/22/2024:  Minimal subsegmental atelectasis of the lower lung zones bilaterally similar to prior exam. Lungs are otherwise clear.       Pertinent Studies Reviewed & Interpreted:     Pulmonary Function Tests:   pending      Other Pertinent Laboratories:  Lab Results   Component Value Date    WBC 9.41 05/25/2024    RBC 4.00 05/25/2024    HGB 11.6 (L) 05/25/2024    MCV 91 05/25/2024    MCH 29.0 05/25/2024    MCHC 32.0 05/25/2024    RDW 12.4 05/25/2024     05/25/2024    MPV 11.0 05/25/2024    GRAN 6.6 05/25/2024    GRAN 70.5 05/25/2024    LYMPH 2.0 05/25/2024    LYMPH 20.9 05/25/2024    MONO 0.6 05/25/2024    MONO 6.6 05/25/2024    EOS 0.1 05/25/2024    BASO 0.03 05/25/2024      Latest Reference Range & Units 11/27/11 17:55 12/02/11 04:45 04/11/16 22:46 04/12/16 04:03 04/13/16 03:18 04/15/16 03:58 04/16/16 03:34 04/18/16 04:02 04/20/16 05:00 04/21/16 05:50 04/22/16 04:30 04/23/16 06:06 04/24/16 07:17 04/25/16 05:50 04/26/16 06:57 04/27/16 04:00 04/28/16 05:24 04/29/16 05:39 04/30/16 05:15 05/01/16 05:22 05/02/16 04:42 05/03/16 07:41 05/05/16 04:39 05/09/16 06:17 05/12/16 08:49 05/16/16 05:00 05/19/16 04:22 05/23/16 04:24 11/10/21  18:27 11/12/21 04:36 11/13/21 04:58 11/14/21 05:21 11/15/21 05:21 11/16/21 05:22 01/03/22 13:50 05/25/24 11:44   Eos # 0.0 - 0.5 K/uL 0.0 0.1 0.0 0.1 0.0 0.1 0.1 0.3 0.3 0.3 0.3 0.3 0.3 0.3 0.3 0.5 0.5 0.4  0.4 0.5 0.5 0.5 0.6 (H) 0.7 (H) 0.6 (H) 0.5 0.4 0.3 0.3 0.0 0.1 0.0 0.1 0.3 0.4 0.1 0.1       Assessment & Plan:       Plan:  Clinical impression is resonably certain and repeated evaluation prn +/- follow up will be needed as below.      1. Chronic cough  -     Complete PFT with bronchodilator; Future    2. OSEGUERA (dyspnea on exertion)  -     Complete PFT with bronchodilator; Future    3. Mild intermittent asthma, unspecified whether complicated  -     Complete PFT with bronchodilator; Future  -     albuterol-ipratropium (DUO-NEB) 2.5 mg-0.5 mg/3 mL nebulizer solution; Take 3 mLs by nebulization every 6 (six) hours as needed for Wheezing. Rescue  Dispense: 120 mL; Refill: 5  -     NEBULIZER FOR HOME USE  -     budesonide-formoterol 160-4.5 mcg (SYMBICORT) 160-4.5 mcg/actuation HFAA; Inhale 2 puffs into the lungs every 12 (twelve) hours. Controller  Dispense: 10.2 g; Refill: 11        Discussed with patient above for education the following:    -Obtain PFT to evaluate for an obstructive lung pattern  -Trial ICS/LABA now to help with cough. Depending on test results, we may discontinue or recommend long term treatment.    -Continue using albuterol as needed for sob or wheezing.   -They may have a nebulizer machine at home. They will notify us if they do not have one and we will order one for them.     RETURN TO CLINIC IN 2 weeks     Total professional time spent for the encounter: 36 minutes  Time was spent preparing to see the patient, reviewing results of prior testing, obtaining and/or reviewing separately obtained history, performing a medically appropriate examination and interview, counseling and educating the patient/family, ordering medications/tests/procedures, referring and communicating with other health  care professionals, documenting clinical information in the electronic health record, and independently interpreting results.    Nieves Christensen, DNP

## 2024-10-16 ENCOUNTER — TELEPHONE (OUTPATIENT)
Dept: PULMONOLOGY | Facility: CLINIC | Age: 89
End: 2024-10-16
Payer: MEDICARE

## 2024-10-16 NOTE — TELEPHONE ENCOUNTER
Returned call to son Janes, he shared that the med's were out of stock and needed to be ordered also spoke to the pharmacy Med's are in budesonide-formoterol 160-4.5 mcg is $45 spoke with Kuldeep to let him know the price, he is going to pick them up.CF

## 2024-10-21 NOTE — TELEPHONE ENCOUNTER
Spoke with Pt's daughter ( Celia) to let her know that Jossy Lizama Order is to take  2 puffs in the morning and 2 puffs at night time. And to FU on 10/29 for FU and PFT 9 am and FU After @10 am.CF

## 2024-10-30 ENCOUNTER — OFFICE VISIT (OUTPATIENT)
Dept: PULMONOLOGY | Facility: CLINIC | Age: 89
End: 2024-10-30
Payer: MEDICARE

## 2024-10-30 VITALS — DIASTOLIC BLOOD PRESSURE: 78 MMHG | HEART RATE: 89 BPM | OXYGEN SATURATION: 100 % | SYSTOLIC BLOOD PRESSURE: 144 MMHG

## 2024-10-30 DIAGNOSIS — J45.20 MILD INTERMITTENT ASTHMA, UNSPECIFIED WHETHER COMPLICATED: Primary | ICD-10-CM

## 2024-10-30 PROCEDURE — 1160F RVW MEDS BY RX/DR IN RCRD: CPT | Mod: CPTII,S$GLB,,

## 2024-10-30 PROCEDURE — 1159F MED LIST DOCD IN RCRD: CPT | Mod: CPTII,S$GLB,,

## 2024-10-30 PROCEDURE — 99999 PR PBB SHADOW E&M-EST. PATIENT-LVL III: CPT | Mod: PBBFAC,,,

## 2024-10-30 PROCEDURE — 99213 OFFICE O/P EST LOW 20 MIN: CPT | Mod: 25,S$GLB,,

## 2024-11-08 ENCOUNTER — TELEPHONE (OUTPATIENT)
Dept: PULMONOLOGY | Facility: CLINIC | Age: 89
End: 2024-11-08
Payer: MEDICARE

## 2024-11-08 NOTE — TELEPHONE ENCOUNTER
Telephone encounter with Ms. Martines. We clarified dosage and duration of treatment. Symbicort is twice daily. Reminded of follow up appointment. They will call back if she is needing to be seen earlier.   ----- Message from Nurse Gonsalez sent at 11/8/2024 12:33 PM CST -----    ----- Message -----  From: Lisa Cruz  Sent: 11/8/2024  12:00 PM CST  To: Fermin Pickett Staff    Mrs Fenton daughter stopped by cause someone was suppose to called her about how to give her mom the meds that was prescribed. She states its not detailed enough for her and would some to give her a call. [phone]. Bobbi Yang

## 2025-01-03 ENCOUNTER — TELEPHONE (OUTPATIENT)
Dept: PULMONOLOGY | Facility: CLINIC | Age: OVER 89
End: 2025-01-03
Payer: MEDICARE

## 2025-01-03 NOTE — TELEPHONE ENCOUNTER
Telephone encounter with Ms. Martines. We clarified dosage and duration of treatment. Symbicort is twice daily. Reminded of follow up appointment. They will call back if she is needing to be seen earlier.         ----- Message from Raimundo sent at 1/3/2025  9:29 AM CST -----  Patient's daughter called asking to speak with your office about SYMBICORT INHALER 160-4.5. Daughter can be reached at  or [phone].

## 2025-01-22 DIAGNOSIS — J45.20 MILD INTERMITTENT ASTHMA, UNSPECIFIED WHETHER COMPLICATED: ICD-10-CM

## 2025-01-22 RX ORDER — BUDESONIDE AND FORMOTEROL FUMARATE DIHYDRATE 160; 4.5 UG/1; UG/1
2 AEROSOL RESPIRATORY (INHALATION) EVERY 12 HOURS
Qty: 10.2 G | Refills: 11 | Status: SHIPPED | OUTPATIENT
Start: 2025-01-22 | End: 2026-01-22

## 2025-04-30 ENCOUNTER — OFFICE VISIT (OUTPATIENT)
Dept: PULMONOLOGY | Facility: CLINIC | Age: OVER 89
End: 2025-04-30
Payer: MEDICARE

## 2025-04-30 VITALS — HEART RATE: 93 BPM | SYSTOLIC BLOOD PRESSURE: 129 MMHG | DIASTOLIC BLOOD PRESSURE: 75 MMHG | OXYGEN SATURATION: 98 %

## 2025-04-30 DIAGNOSIS — E11.69 TYPE 2 DIABETES MELLITUS WITH OTHER SPECIFIED COMPLICATION, UNSPECIFIED WHETHER LONG TERM INSULIN USE: Primary | ICD-10-CM

## 2025-04-30 DIAGNOSIS — J45.20 MILD INTERMITTENT ASTHMA, UNSPECIFIED WHETHER COMPLICATED: ICD-10-CM

## 2025-04-30 DIAGNOSIS — R05.3 CHRONIC COUGH: ICD-10-CM

## 2025-04-30 PROCEDURE — 1160F RVW MEDS BY RX/DR IN RCRD: CPT | Mod: CPTII,S$GLB,,

## 2025-04-30 PROCEDURE — 99214 OFFICE O/P EST MOD 30 MIN: CPT | Mod: S$GLB,,,

## 2025-04-30 PROCEDURE — 1159F MED LIST DOCD IN RCRD: CPT | Mod: CPTII,S$GLB,,

## 2025-04-30 PROCEDURE — 3288F FALL RISK ASSESSMENT DOCD: CPT | Mod: CPTII,S$GLB,,

## 2025-04-30 PROCEDURE — 99999 PR PBB SHADOW E&M-EST. PATIENT-LVL III: CPT | Mod: PBBFAC,,,

## 2025-04-30 PROCEDURE — 1101F PT FALLS ASSESS-DOCD LE1/YR: CPT | Mod: CPTII,S$GLB,,

## 2025-04-30 PROCEDURE — 1125F AMNT PAIN NOTED PAIN PRSNT: CPT | Mod: CPTII,S$GLB,,

## 2025-04-30 NOTE — PROGRESS NOTES
History and Physical Note  [facility]    Subjective:     Reason for visit: Chronic cough     Patient ID:  Marylu Fenton is a 89 y.o. female    Interval History 04/30/2025:   Patient presents today for chronic bronchitis follow up.  She reports chronic cough is 100% improved but still present, characterized as mostly dry. She continues Symbicort twice daily (morning and night). She denies significant shortness of breath.    She reports new onset bilateral leg swelling that started this week. The swelling developed after changing to side-sleeping position for the past 2-3 weeks.  She has been sleeping on her side instead of upright position for the past 2-3 weeks to assist with wound healing on her backside. She reports irritation from the side-sleeping position.    She requires assistance with transfers in and out of car, needing to be physically lifted.  She uses salt only during cooking process, not adding additional salt after cooking. Her most recent meal consisted of red beans and chicken.    Interval History 10/30/2024:   Patient is here for follow up and PFT results. Coughing has improved significantly since starting symbicort. Son has noticed that the nocturnal coughing has improved in the last two weeks. They do not have access to a working nebulizer. She is interested in using the nebulizer treatments during severe coughing fits.   No obstruction on her latest PFT results.   She alternates staying with her son and daughter.     Interval History 10/15/2024:  Patient is here as a referral from her PCP for evaluation of chronic cough. She is here with her son Al that provides additional history.   Cough has been present for months now. Mostly couging at night time. Mostly dry and intermittently productive with clear sputum. Trailed antihistamine use for allergies with no relief. No ace inhibitor use.   No fevers or unintentional weight loss.  She is struggling with chest tightness and audible  wheezing intermittently.   She has been living in the same home for many years. No flood during [place]. No previous exposure to asbestos.   No viral infection in the last 3 months.   No postprandial cough. No dysphagia. She is on a PPI for gastric reflux. She maintains the HOB elevated while sleeping. She eats her last meal about 4 hours before she lays down to sleep   She was given an albuterol rescue inhaler and this does provide relief but only short term.     Additional Pulmonary History:  Environmental Exposures: no asbestos exposure  Exposure to Animals/Pets: None;   Travel History: none  Childhood Illnesses:  none  Tobacco/Smoking: life time non-smoker  Social History     Tobacco Use   Smoking Status Never   Smokeless Tobacco Never       Objective:     Vitals:    04/30/25 1103   BP: 129/75   BP Location: Left arm   Patient Position: Sitting   Pulse: 93   SpO2: 98%           Physical Exam  Constitutional:       Appearance: Normal appearance. She is well-developed.   HENT:      Head: Normocephalic.   Cardiovascular:      Rate and Rhythm: Normal rate.      Pulses: Normal pulses.      Heart sounds: Normal heart sounds, S1 normal and S2 normal.   Pulmonary:      Effort: Pulmonary effort is normal.      Breath sounds: Normal breath sounds. No decreased breath sounds.   Musculoskeletal:      Right lower leg: No edema.      Left lower leg: No edema.   Skin:     General: Skin is warm.      Capillary Refill: Capillary refill takes less than 2 seconds.      Findings: No rash.      Nails: There is no clubbing.   Neurological:      Mental Status: She is alert and oriented to person, place, and time. Mental status is at baseline.   Psychiatric:         Attention and Perception: Attention normal.         Mood and Affect: Mood and affect normal.         Speech: Speech normal.         Behavior: Behavior is cooperative.       Personal Diagnostic Review and Interpretation  05/25/2024 CTA Chest:   Lungs: Lungs are well expanded.  Minimal atelectasis of the lower lobes bilaterally. No focal consolidation. No pleural effusion. No pneumothorax.     Chest X-Ray 09/22/2024:  Minimal subsegmental atelectasis of the lower lung zones bilaterally similar to prior exam. Lungs are otherwise clear.       Pertinent Studies Reviewed & Interpreted:     Pulmonary Function Tests:     PFT  10/30/2024    FVC 1.39 (66 % predicted),   FEV1 1.07 (68.1 % predicted),   FEV1/FVC 77% ratio   TLC 3.01 (55.4 % predicted),     Other Pertinent Laboratories:  Lab Results   Component Value Date    WBC 9.41 05/25/2024    RBC 4.00 05/25/2024    HGB 11.6 (L) 05/25/2024    MCV 91 05/25/2024    MCH 29.0 05/25/2024    MCHC 32.0 05/25/2024    RDW 12.4 05/25/2024     05/25/2024    MPV 11.0 05/25/2024    GRAN 6.6 05/25/2024    GRAN 70.5 05/25/2024    LYMPH 2.0 05/25/2024    LYMPH 20.9 05/25/2024    MONO 0.6 05/25/2024    MONO 6.6 05/25/2024    EOS 0.1 05/25/2024    BASO 0.03 05/25/2024      [facility] Reference Range & Units 11/27/11 17:55 12/02/11 04:45 04/11/16 22:46 04/12/16 04:03 04/13/16 03:18 04/15/16 03:58 04/16/16 03:34 04/18/16 04:02 04/20/16 05:00 04/21/16 05:50 04/22/16 04:30 04/23/16 06:06 04/24/16 07:17 04/25/16 05:50 04/26/16 06:57 04/27/16 04:00 04/28/16 05:24 04/29/16 05:39 04/30/16 05:15 05/01/16 05:22 05/02/16 04:42 05/03/16 07:41 05/05/16 04:39 05/09/16 06:17 05/12/16 08:49 05/16/16 05:00 05/19/16 04:22 05/23/16 04:24 11/10/21 18:27 11/12/21 04:36 11/13/21 04:58 11/14/21 05:21 11/15/21 05:21 11/16/21 05:22 01/03/22 13:50 05/25/24 11:44   Eos # 0.0 - 0.5 K/uL 0.0 0.1 0.0 0.1 0.0 0.1 0.1 0.3 0.3 0.3 0.3 0.3 0.3 0.3 0.3 0.5 0.5 0.4  0.4 0.5 0.5 0.5 0.6 (H) 0.7 (H) 0.6 (H) 0.5 0.4 0.3 0.3 0.0 0.1 0.0 0.1 0.3 0.4 0.1 0.1       Assessment & Plan:   Plan:  Clinical impression is resonably certain and repeated evaluation prn +/- follow up will be needed as below.  Chronic bronchitis   No chronic obstruction on PFT.  Significant improvement of cough on ICS/LABA.We will  continue this medication daily with albuterol only as needed.     LIFESTYLE CHANGES:  - Recommend reducing salt intake to help manage fluid retention.  - Advised monitoring weight for sudden changes that could indicate fluid retention, patient to obtain a scale for daily weight monitoring, if possible.  - Explained pressure injury prevention through regular position changes every 2-3 hours, patient to attempt these changes.  - Discussed importance of annual flu vaccination for prevention, especially given prevalence of flu in the community.    TYPE 2 DIABETES MELLITUS WITH OTHER SPECIFIED COMPLICATION, UNSPECIFIED WHETHER LONG TERM INSULIN USE:  - Ordered A1C blood test.Per the request of the patients PCP, we will be ordering a hemoglobin A1c to help coordinate care, as it is difficult for the patient to leave her home. The patient is aware that the PCP will review the results with her directly.    RETURN TO CLINIC IN 6 months      Total professional time spent for the encounter: 30 minutes  Time was spent preparing to see the patient, reviewing results of prior testing, obtaining and/or reviewing separately obtained history, performing a medically appropriate examination and interview, counseling and educating the patient/family, ordering medications/tests/procedures, referring and communicating with other health care professionals, documenting clinical information in the electronic health record, and independently interpreting results.    Nieves Christensen, DNP

## 2025-05-27 ENCOUNTER — TELEPHONE (OUTPATIENT)
Dept: PODIATRY | Facility: CLINIC | Age: OVER 89
End: 2025-05-27
Payer: MEDICARE

## 2025-05-27 NOTE — TELEPHONE ENCOUNTER
Spoke with her daughter Bobbi & July 9, 2025 is the sooonest we have for a new patient. They will keep that appointment. Verbalized understanding and no further issues discussed.----- Message from Masha sent at 5/27/2025  1:20 PM CDT -----  Name of Caller: Bobbi  Nature of Call: requesting a sooner appt Best Call Back Number: [phone] Additional Information:Marylu Fenton daughter Bobbi calling regarding Appointment Access. I scheduled the PT for 07/09/25 for having a big white bump that filled with liquid on the left heel of the foot, but the daughter is asking to have her seen much sooner for this care, Please call back to inform if a sooner appt is possible

## (undated) DEVICE — TAPE MEDIPORE 3 X 10YD

## (undated) DEVICE — DRESSING N ADH OIL EMUL 3X8

## (undated) DEVICE — DRAPE SURG W/TWL 17 5/8X23

## (undated) DEVICE — DRAPE STERI U-SHAPED 47X51IN

## (undated) DEVICE — GLOVE BIOGEL SKINSENSE PI 8.5

## (undated) DEVICE — MASK FLYTE HOOD PEEL AWAY

## (undated) DEVICE — APPLICATOR CHLORAPREP ORN 26ML

## (undated) DEVICE — SEE MEDLINE ITEM 157117

## (undated) DEVICE — ORTHOCORD W/OS-6

## (undated) DEVICE — NDL 18GA X1 1/2 REG BEVEL

## (undated) DEVICE — TIP YANKAUERS BULB NO VENT

## (undated) DEVICE — SUT VICRYL 2-0 8-18 CP-2

## (undated) DEVICE — UNDERGLOVE BIOGEL PI SZ 6.5 LF

## (undated) DEVICE — Device

## (undated) DEVICE — GLOVE BIOGEL SKINSENSE PI 6.5

## (undated) DEVICE — SYR 50CC LL

## (undated) DEVICE — STAPLER SKIN ROTATING HEAD

## (undated) DEVICE — DRAPE HIP TIBURON 87X115X134

## (undated) DEVICE — BLANKET HYPER ADULT 24X60IN

## (undated) DEVICE — BLADE SAW SAG 25.40MM X 1.27MM

## (undated) DEVICE — PAD ABD 8X10 STERILE

## (undated) DEVICE — SOL NACL STRL BOTTLE 1000ML

## (undated) DEVICE — DRAPE INCISE IOBAN 2 23X17IN

## (undated) DEVICE — ELECTRODE REM PLYHSV RETURN 9

## (undated) DEVICE — PILLOW ABDUCTION SM

## (undated) DEVICE — KIT BONE CEMENT PREPARATION

## (undated) DEVICE — SPONGE COTTON TRAY 4X4IN

## (undated) DEVICE — SPONGE GAUZE 16PLY 4X4

## (undated) DEVICE — COVER BACK TABLE 72X21

## (undated) DEVICE — TRAY FOLEY 16FR INFECTION CONT

## (undated) DEVICE — PILLOW SMALL ABDUCTION

## (undated) DEVICE — SUT VICRYL+ 1 CTX 18IN VIOL